# Patient Record
Sex: FEMALE | Race: OTHER | Employment: FULL TIME | URBAN - METROPOLITAN AREA
[De-identification: names, ages, dates, MRNs, and addresses within clinical notes are randomized per-mention and may not be internally consistent; named-entity substitution may affect disease eponyms.]

---

## 2017-01-27 ENCOUNTER — HOSPITAL ENCOUNTER (EMERGENCY)
Facility: HOSPITAL | Age: 42
Discharge: HOME | End: 2017-01-27
Payer: MEDICAID

## 2017-01-27 VITALS
RESPIRATION RATE: 18 BRPM | SYSTOLIC BLOOD PRESSURE: 116 MMHG | HEIGHT: 61 IN | WEIGHT: 160 LBS | BODY MASS INDEX: 30.21 KG/M2 | OXYGEN SATURATION: 99 % | DIASTOLIC BLOOD PRESSURE: 68 MMHG | TEMPERATURE: 99 F | HEART RATE: 101 BPM

## 2017-01-27 DIAGNOSIS — B34.9 VIRAL ILLNESS: Primary | ICD-10-CM

## 2017-01-27 PROCEDURE — 99283 EMERGENCY DEPT VISIT LOW MDM: CPT

## 2017-01-27 RX ORDER — IBUPROFEN 600 MG/1
600 TABLET ORAL ONCE
Status: COMPLETED | OUTPATIENT
Start: 2017-01-27 | End: 2017-01-27

## 2017-01-27 NOTE — ED PROVIDER NOTES
Patient Seen in: [facility] Emergency Department    History   Patient presents with:  Sore Throat    Stated Complaint: sore throat    HPI    Patient presents into the emergency room for evaluation of sore throat.   Patient states the onset of these Current:/68 mmHg  Pulse 101  Temp(Src) 98.6 °F (37 °C) (Oral)  Resp 18  Ht 154.9 cm (5' 1\")  Wt 72.576 kg  BMI 30.25 kg/m2  SpO2 99%  LMP 12/28/2016 (Approximate)        Physical Exam   Constitutional: She is oriented to person, place, and yno

## 2017-01-30 ENCOUNTER — OFFICE VISIT (OUTPATIENT)
Dept: FAMILY MEDICINE CLINIC | Facility: CLINIC | Age: 42
End: 2017-01-30

## 2017-01-30 VITALS
SYSTOLIC BLOOD PRESSURE: 134 MMHG | TEMPERATURE: 98 F | HEART RATE: 86 BPM | DIASTOLIC BLOOD PRESSURE: 93 MMHG | BODY MASS INDEX: 30.4 KG/M2 | RESPIRATION RATE: 20 BRPM | WEIGHT: 161 LBS | HEIGHT: 61 IN

## 2017-01-30 DIAGNOSIS — J06.9 ACUTE URI: ICD-10-CM

## 2017-01-30 DIAGNOSIS — J02.9 SORE THROAT: ICD-10-CM

## 2017-01-30 PROCEDURE — 99213 OFFICE O/P EST LOW 20 MIN: CPT | Performed by: FAMILY MEDICINE

## 2017-01-30 PROCEDURE — 99212 OFFICE O/P EST SF 10 MIN: CPT | Performed by: FAMILY MEDICINE

## 2017-01-30 RX ORDER — AMOXICILLIN 875 MG/1
875 TABLET, COATED ORAL 2 TIMES DAILY
Qty: 20 TABLET | Refills: 0 | Status: SHIPPED | OUTPATIENT
Start: 2017-01-30 | End: 2018-08-04

## 2017-01-30 NOTE — PROGRESS NOTES
HPI:    Patient ID: Daphene Frankel is a 42year old female. HPI Comments: Pt presents with cold symptoms for 7 days. Pt has had cough, sore throat with runny nose. No fevers. Pt has tried otc remedies without relief.  Pt states sick contacts with her charity Imaging & Referrals:  None       IY#[id number]

## 2017-12-20 ENCOUNTER — APPOINTMENT (OUTPATIENT)
Dept: OTHER | Facility: HOSPITAL | Age: 42
End: 2017-12-20
Attending: EMERGENCY MEDICINE

## 2018-04-18 ENCOUNTER — NURSE TRIAGE (OUTPATIENT)
Dept: OTHER | Age: 43
End: 2018-04-18

## 2018-04-18 NOTE — TELEPHONE ENCOUNTER
BRODIETCIVANA   When the patient calls back please let her know she will be self pay for her insurance termed on 3/31/18. Please try again later.

## 2018-04-18 NOTE — TELEPHONE ENCOUNTER
Action Requested: Summary for Provider     []  Critical Lab, Recommendations Needed  [] Need Additional Advice  []   FYI    []   Need Orders  [] Need Medications Sent to Pharmacy  []  Other     SUMMARY:   No appointment noted for today.    Appointment made

## 2018-04-19 ENCOUNTER — TELEPHONE (OUTPATIENT)
Dept: FAMILY MEDICINE CLINIC | Facility: CLINIC | Age: 43
End: 2018-04-19

## 2018-04-19 NOTE — TELEPHONE ENCOUNTER
Spoke with patient and states she will be self pay at [address] tomorrow. She just started a new job and is waiting on that insurance, but cannot wait any longer to get her symptoms evaluated. No further questions/concerns at this time.

## 2018-04-19 NOTE — TELEPHONE ENCOUNTER
Pt was late for her appt on 4/19 and the next available appt for her was not until 5/19 pt didn't want that date she wanted something sooner because the appt regards a lump under the breast        Please advise

## 2018-04-19 NOTE — TELEPHONE ENCOUNTER
Message noted; I believe I have sooner appointment then that. If she needs to see someone for late hours then will need to see one of the other physicians here like Dr Kaity Jaeger or Steven. . Otherwise you can add her to a SDS on a day that is convenient for her o

## 2018-04-20 NOTE — TELEPHONE ENCOUNTER
No SDS appts left at [facility] for Sat 4/21    Pt only available after 5:00p during week- SDS slots available Tues 4/24 with [facility] or [facility]    Is it ok to use one of these SDS slots?     Please reply to pool: LOLLY Mata

## 2018-08-04 ENCOUNTER — OFFICE VISIT (OUTPATIENT)
Dept: OBGYN CLINIC | Facility: CLINIC | Age: 43
End: 2018-08-04
Payer: COMMERCIAL

## 2018-08-04 VITALS
HEIGHT: 61 IN | DIASTOLIC BLOOD PRESSURE: 79 MMHG | WEIGHT: 191 LBS | SYSTOLIC BLOOD PRESSURE: 115 MMHG | BODY MASS INDEX: 36.06 KG/M2 | HEART RATE: 66 BPM

## 2018-08-04 DIAGNOSIS — N95.1 SYMPTOMATIC MENOPAUSAL OR FEMALE CLIMACTERIC STATES: ICD-10-CM

## 2018-08-04 DIAGNOSIS — N39.3 STRESS INCONTINENCE IN FEMALE: ICD-10-CM

## 2018-08-04 DIAGNOSIS — Z01.419 WOMEN'S ANNUAL ROUTINE GYNECOLOGICAL EXAMINATION: Primary | ICD-10-CM

## 2018-08-04 DIAGNOSIS — Z11.3 SCREEN FOR STD (SEXUALLY TRANSMITTED DISEASE): ICD-10-CM

## 2018-08-04 PROCEDURE — 99396 PREV VISIT EST AGE 40-64: CPT | Performed by: ADVANCED PRACTICE MIDWIFE

## 2018-08-04 NOTE — PROGRESS NOTES
HPI:    Patient ID: Mariam Moffett is a 36year old female who presents for her annual gyne exam.  Pt is in a monogamous relationship has a TL for contraception. Denies any ETOH or drug use. Denies any abuse.   Denies any hx of GDM or HTN in pregnancie foreign body in the vagina. No vaginal discharge found. Genitourinary Comments: Limited by central obesity     Lymphadenopathy:        Right: No inguinal adenopathy present. Left: No inguinal adenopathy present.    Neurological: She is alert and or

## 2018-08-05 ENCOUNTER — APPOINTMENT (OUTPATIENT)
Dept: LAB | Facility: HOSPITAL | Age: 43
End: 2018-08-05
Attending: UROLOGY
Payer: COMMERCIAL

## 2018-08-05 DIAGNOSIS — Z01.419 WOMEN'S ANNUAL ROUTINE GYNECOLOGICAL EXAMINATION: ICD-10-CM

## 2018-08-05 LAB
ALBUMIN SERPL BCP-MCNC: 3.7 G/DL (ref 3.5–4.8)
ALBUMIN/GLOB SERPL: 1.1 {RATIO} (ref 1–2)
ALP SERPL-CCNC: 80 U/L (ref 32–100)
ALT SERPL-CCNC: 16 U/L (ref 14–54)
ANION GAP SERPL CALC-SCNC: 7 MMOL/L (ref 0–18)
AST SERPL-CCNC: 18 U/L (ref 15–41)
BILIRUB SERPL-MCNC: 0.5 MG/DL (ref 0.3–1.2)
BUN SERPL-MCNC: 18 MG/DL (ref 8–20)
BUN/CREAT SERPL: 28.6 (ref 10–20)
CALCIUM SERPL-MCNC: 9 MG/DL (ref 8.5–10.5)
CHLORIDE SERPL-SCNC: 106 MMOL/L (ref 95–110)
CHOLEST SERPL-MCNC: 234 MG/DL (ref 110–200)
CO2 SERPL-SCNC: 28 MMOL/L (ref 22–32)
CREAT SERPL-MCNC: 0.63 MG/DL (ref 0.5–1.5)
ERYTHROCYTE [DISTWIDTH] IN BLOOD BY AUTOMATED COUNT: 17.4 % (ref 11–15)
GLOBULIN PLAS-MCNC: 3.5 G/DL (ref 2.5–3.7)
GLUCOSE SERPL-MCNC: 120 MG/DL (ref 70–99)
HCT VFR BLD AUTO: 34.6 % (ref 35–48)
HDLC SERPL-MCNC: 48 MG/DL
HGB BLD-MCNC: 11.2 G/DL (ref 12–16)
LDLC SERPL CALC-MCNC: 158 MG/DL (ref 0–99)
MCH RBC QN AUTO: 25.2 PG (ref 27–32)
MCHC RBC AUTO-ENTMCNC: 32.2 G/DL (ref 32–37)
MCV RBC AUTO: 78.2 FL (ref 80–100)
NONHDLC SERPL-MCNC: 186 MG/DL
OSMOLALITY UR CALC.SUM OF ELEC: 295 MOSM/KG (ref 275–295)
PATIENT FASTING: YES
PLATELET # BLD AUTO: 365 K/UL (ref 140–400)
PMV BLD AUTO: 8 FL (ref 7.4–10.3)
POTASSIUM SERPL-SCNC: 4.2 MMOL/L (ref 3.3–5.1)
PROT SERPL-MCNC: 7.2 G/DL (ref 5.9–8.4)
RBC # BLD AUTO: 4.42 M/UL (ref 3.7–5.4)
SODIUM SERPL-SCNC: 141 MMOL/L (ref 136–144)
TRIGL SERPL-MCNC: 138 MG/DL (ref 1–149)
TSH SERPL-ACNC: 2.17 UIU/ML (ref 0.45–5.33)
WBC # BLD AUTO: 6.5 K/UL (ref 4–11)

## 2018-08-05 PROCEDURE — 83036 HEMOGLOBIN GLYCOSYLATED A1C: CPT

## 2018-08-05 PROCEDURE — 85027 COMPLETE CBC AUTOMATED: CPT

## 2018-08-05 PROCEDURE — 80061 LIPID PANEL: CPT

## 2018-08-05 PROCEDURE — 36415 COLL VENOUS BLD VENIPUNCTURE: CPT

## 2018-08-05 PROCEDURE — 80053 COMPREHEN METABOLIC PANEL: CPT

## 2018-08-05 PROCEDURE — 84443 ASSAY THYROID STIM HORMONE: CPT

## 2018-08-06 LAB
C TRACH DNA SPEC QL NAA+PROBE: NEGATIVE
HBA1C MFR BLD: 6 % (ref 4–6)
HPV I/H RISK 1 DNA SPEC QL NAA+PROBE: NEGATIVE
N GONORRHOEA DNA SPEC QL NAA+PROBE: NEGATIVE

## 2018-08-07 ENCOUNTER — TELEPHONE (OUTPATIENT)
Dept: OBGYN CLINIC | Facility: CLINIC | Age: 43
End: 2018-08-07

## 2018-08-07 NOTE — TELEPHONE ENCOUNTER
----- Message from Zulma Gee CNM sent at 8/7/2018  8:52 AM CDT -----  Please call [facility] , lipids and glucose are high  Pt needs to have appt with PCP for management

## 2018-08-07 NOTE — TELEPHONE ENCOUNTER
Spoke with pt and advised per MES HA1C, lipids, and glucose are high and pt needs to make appt with PCP for management. Pt agreed and voiced understanding.

## 2018-08-08 ENCOUNTER — TELEPHONE (OUTPATIENT)
Dept: OBGYN CLINIC | Facility: CLINIC | Age: 43
End: 2018-08-08

## 2018-08-08 LAB — LAST PAP RESULT: NORMAL

## 2018-08-08 NOTE — TELEPHONE ENCOUNTER
Spoke with pt and advised of normal pap smear per MES. Pt has appt scheduled with PCP on 8/11. Pt agreed and voiced understanding.

## 2018-08-08 NOTE — TELEPHONE ENCOUNTER
----- Message from Anastacia Yan CNM sent at 8/8/2018 12:55 PM CDT -----  Please call Pap normal  See if she has scheduled her PCP appt for her abnormal labs

## 2018-08-11 ENCOUNTER — OFFICE VISIT (OUTPATIENT)
Dept: FAMILY MEDICINE CLINIC | Facility: CLINIC | Age: 43
End: 2018-08-11
Payer: COMMERCIAL

## 2018-08-11 VITALS
WEIGHT: 191 LBS | SYSTOLIC BLOOD PRESSURE: 102 MMHG | RESPIRATION RATE: 18 BRPM | TEMPERATURE: 98 F | BODY MASS INDEX: 36.06 KG/M2 | HEIGHT: 61 IN | DIASTOLIC BLOOD PRESSURE: 69 MMHG | HEART RATE: 71 BPM

## 2018-08-11 DIAGNOSIS — E78.00 ELEVATED CHOLESTEROL: ICD-10-CM

## 2018-08-11 DIAGNOSIS — R73.03 PREDIABETES: ICD-10-CM

## 2018-08-11 PROCEDURE — 99213 OFFICE O/P EST LOW 20 MIN: CPT | Performed by: FAMILY MEDICINE

## 2018-08-11 PROCEDURE — 99212 OFFICE O/P EST SF 10 MIN: CPT | Performed by: FAMILY MEDICINE

## 2018-08-11 NOTE — PROGRESS NOTES
HPI:    Patient ID: Raegan Tong is a 36year old female. Pt had his blood work prior to the appointment with Chayito Almazan. Lab work was stable and good except glucose of 120 and elevation of cholesterol.  Pt was encouraged to follow up to discuss r

## 2018-10-04 ENCOUNTER — TELEPHONE (OUTPATIENT)
Dept: OBGYN CLINIC | Facility: CLINIC | Age: 43
End: 2018-10-04

## 2018-11-12 ENCOUNTER — HOSPITAL ENCOUNTER (OUTPATIENT)
Dept: MAMMOGRAPHY | Facility: HOSPITAL | Age: 43
Discharge: HOME | End: 2018-11-12
Attending: ADVANCED PRACTICE MIDWIFE
Payer: COMMERCIAL

## 2018-11-12 DIAGNOSIS — Z01.419 WOMEN'S ANNUAL ROUTINE GYNECOLOGICAL EXAMINATION: ICD-10-CM

## 2018-11-12 PROCEDURE — 77063 BREAST TOMOSYNTHESIS BI: CPT | Performed by: ADVANCED PRACTICE MIDWIFE

## 2018-11-12 PROCEDURE — 77067 SCR MAMMO BI INCL CAD: CPT | Performed by: ADVANCED PRACTICE MIDWIFE

## 2018-11-29 ENCOUNTER — HOSPITAL ENCOUNTER (OUTPATIENT)
Dept: MAMMOGRAPHY | Facility: HOSPITAL | Age: 43
Discharge: HOME | End: 2018-11-29
Attending: ADVANCED PRACTICE MIDWIFE
Payer: COMMERCIAL

## 2018-11-29 ENCOUNTER — HOSPITAL ENCOUNTER (OUTPATIENT)
Dept: ULTRASOUND IMAGING | Facility: HOSPITAL | Age: 43
Discharge: HOME | End: 2018-11-29
Attending: ADVANCED PRACTICE MIDWIFE
Payer: COMMERCIAL

## 2018-11-29 DIAGNOSIS — R92.8 ABNORMAL MAMMOGRAM: ICD-10-CM

## 2018-11-29 PROCEDURE — 77065 DX MAMMO INCL CAD UNI: CPT | Performed by: ADVANCED PRACTICE MIDWIFE

## 2018-11-29 PROCEDURE — 76642 ULTRASOUND BREAST LIMITED: CPT | Performed by: ADVANCED PRACTICE MIDWIFE

## 2018-11-29 PROCEDURE — 77061 BREAST TOMOSYNTHESIS UNI: CPT | Performed by: ADVANCED PRACTICE MIDWIFE

## 2019-02-28 ENCOUNTER — OFFICE VISIT (OUTPATIENT)
Dept: INTERNAL MEDICINE CLINIC | Facility: CLINIC | Age: 44
End: 2019-02-28

## 2019-02-28 ENCOUNTER — NURSE TRIAGE (OUTPATIENT)
Dept: OTHER | Age: 44
End: 2019-02-28

## 2019-02-28 VITALS
DIASTOLIC BLOOD PRESSURE: 82 MMHG | HEART RATE: 75 BPM | RESPIRATION RATE: 20 BRPM | BODY MASS INDEX: 37.19 KG/M2 | TEMPERATURE: 98 F | WEIGHT: 197 LBS | SYSTOLIC BLOOD PRESSURE: 118 MMHG | HEIGHT: 61 IN

## 2019-02-28 DIAGNOSIS — M79.601 RIGHT UPPER LIMB PAIN: Primary | ICD-10-CM

## 2019-02-28 PROCEDURE — 99214 OFFICE O/P EST MOD 30 MIN: CPT | Performed by: INTERNAL MEDICINE

## 2019-02-28 PROCEDURE — 99212 OFFICE O/P EST SF 10 MIN: CPT | Performed by: INTERNAL MEDICINE

## 2019-02-28 NOTE — PROGRESS NOTES
HPI:    Patient ID: Valencia Tierney is a 39year old female. Patient presents with:  Abdominal Pain: Pt state that she has been having right  upper abdominal pain for the past 6-7 months ago but got worse on yesterday.     Patient presents today for th Psychiatric/Behavioral: Negative for confusion. The patient is not nervous/anxious. No current outpatient medications on file. Allergies:Not on File   PHYSICAL EXAM:   Physical Exam   Constitutional: She is oriented to person, place, and time. Right upper limb pain  (primary encounter diagnosis)  1. Right upper limb pain  Likely gallstones  Pt  educaiton      Recommend Low  Fat  Diet  Avoid  heavy  Meals   - COMP METABOLIC PANEL (14); Future  - LIPID PANEL; Future  - LIPASE;  Future  - CBC WITH D

## 2019-02-28 NOTE — TELEPHONE ENCOUNTER
Action Requested: Summary for Provider     []  Critical Lab, Recommendations Needed  [] Need Additional Advice  [x]   FYI    []   Need Orders  [] Need Medications Sent to Pharmacy  []  Other     SUMMARY: patient stated that has been having pain under her r

## 2019-03-02 ENCOUNTER — LAB ENCOUNTER (OUTPATIENT)
Dept: LAB | Age: 44
End: 2019-03-02
Attending: INTERNAL MEDICINE
Payer: COMMERCIAL

## 2019-03-02 DIAGNOSIS — M79.601 RIGHT UPPER LIMB PAIN: ICD-10-CM

## 2019-03-02 LAB
ALBUMIN SERPL-MCNC: 3.5 G/DL (ref 3.4–5)
ALBUMIN/GLOB SERPL: 0.9 {RATIO} (ref 1–2)
ALP LIVER SERPL-CCNC: 114 U/L (ref 37–98)
ALT SERPL-CCNC: 42 U/L (ref 13–56)
ANION GAP SERPL CALC-SCNC: 7 MMOL/L (ref 0–18)
AST SERPL-CCNC: 28 U/L (ref 15–37)
BASOPHILS # BLD AUTO: 0.05 X10(3) UL (ref 0–0.2)
BASOPHILS NFR BLD AUTO: 0.8 %
BILIRUB SERPL-MCNC: 0.3 MG/DL (ref 0.1–2)
BILIRUB UR QL: NEGATIVE
BUN BLD-MCNC: 21 MG/DL (ref 7–18)
BUN/CREAT SERPL: 31.8 (ref 10–20)
CALCIUM BLD-MCNC: 8.6 MG/DL (ref 8.5–10.1)
CHLORIDE SERPL-SCNC: 108 MMOL/L (ref 98–107)
CHOLEST SMN-MCNC: 238 MG/DL (ref ?–200)
CLARITY UR: CLEAR
CO2 SERPL-SCNC: 26 MMOL/L (ref 21–32)
COLOR UR: YELLOW
CREAT BLD-MCNC: 0.66 MG/DL (ref 0.55–1.02)
DEPRECATED RDW RBC AUTO: 47.6 FL (ref 35.1–46.3)
EOSINOPHIL # BLD AUTO: 0.19 X10(3) UL (ref 0–0.7)
EOSINOPHIL NFR BLD AUTO: 3.1 %
ERYTHROCYTE [DISTWIDTH] IN BLOOD BY AUTOMATED COUNT: 14.7 % (ref 11–15)
GLOBULIN PLAS-MCNC: 4 G/DL (ref 2.8–4.4)
GLUCOSE BLD-MCNC: 123 MG/DL (ref 70–99)
GLUCOSE UR-MCNC: NEGATIVE MG/DL
HCT VFR BLD AUTO: 38.3 % (ref 35–48)
HDLC SERPL-MCNC: 39 MG/DL (ref 40–59)
HGB BLD-MCNC: 11.8 G/DL (ref 12–16)
HGB UR QL STRIP.AUTO: NEGATIVE
IMM GRANULOCYTES # BLD AUTO: 0.01 X10(3) UL (ref 0–1)
IMM GRANULOCYTES NFR BLD: 0.2 %
KETONES UR-MCNC: NEGATIVE MG/DL
LDLC SERPL CALC-MCNC: 134 MG/DL (ref ?–100)
LEUKOCYTE ESTERASE UR QL STRIP.AUTO: NEGATIVE
LIPASE SERPL-CCNC: 203 U/L (ref 73–393)
LYMPHOCYTES # BLD AUTO: 1.64 X10(3) UL (ref 1–4)
LYMPHOCYTES NFR BLD AUTO: 26.8 %
M PROTEIN MFR SERPL ELPH: 7.5 G/DL (ref 6.4–8.2)
MCH RBC QN AUTO: 27 PG (ref 26–34)
MCHC RBC AUTO-ENTMCNC: 30.8 G/DL (ref 31–37)
MCV RBC AUTO: 87.6 FL (ref 80–100)
MONOCYTES # BLD AUTO: 0.48 X10(3) UL (ref 0.1–1)
MONOCYTES NFR BLD AUTO: 7.8 %
NEUTROPHILS # BLD AUTO: 3.76 X10 (3) UL (ref 1.5–7.7)
NEUTROPHILS # BLD AUTO: 3.76 X10(3) UL (ref 1.5–7.7)
NEUTROPHILS NFR BLD AUTO: 61.3 %
NITRITE UR QL STRIP.AUTO: NEGATIVE
NONHDLC SERPL-MCNC: 199 MG/DL (ref ?–130)
OSMOLALITY SERPL CALC.SUM OF ELEC: 296 MOSM/KG (ref 275–295)
PH UR: 5 [PH] (ref 5–8)
PLATELET # BLD AUTO: 332 10(3)UL (ref 150–450)
POTASSIUM SERPL-SCNC: 4.3 MMOL/L (ref 3.5–5.1)
PROT UR-MCNC: NEGATIVE MG/DL
RBC # BLD AUTO: 4.37 X10(6)UL (ref 3.8–5.3)
SODIUM SERPL-SCNC: 141 MMOL/L (ref 136–145)
SP GR UR STRIP: 1.02 (ref 1–1.03)
TRIGL SERPL-MCNC: 323 MG/DL (ref 30–149)
UROBILINOGEN UR STRIP-ACNC: <2
VIT C UR-MCNC: NEGATIVE MG/DL
VLDLC SERPL CALC-MCNC: 65 MG/DL (ref 0–30)
WBC # BLD AUTO: 6.1 X10(3) UL (ref 4–11)

## 2019-03-02 PROCEDURE — 85025 COMPLETE CBC W/AUTO DIFF WBC: CPT

## 2019-03-02 PROCEDURE — 80061 LIPID PANEL: CPT

## 2019-03-02 PROCEDURE — 36415 COLL VENOUS BLD VENIPUNCTURE: CPT

## 2019-03-02 PROCEDURE — 80053 COMPREHEN METABOLIC PANEL: CPT

## 2019-03-02 PROCEDURE — 83690 ASSAY OF LIPASE: CPT

## 2019-03-02 PROCEDURE — 81003 URINALYSIS AUTO W/O SCOPE: CPT

## 2019-03-06 ENCOUNTER — APPOINTMENT (OUTPATIENT)
Dept: ULTRASOUND IMAGING | Facility: HOSPITAL | Age: 44
End: 2019-03-06
Attending: EMERGENCY MEDICINE
Payer: COMMERCIAL

## 2019-03-06 ENCOUNTER — HOSPITAL ENCOUNTER (EMERGENCY)
Facility: HOSPITAL | Age: 44
Discharge: HOME | End: 2019-03-06
Attending: EMERGENCY MEDICINE
Payer: COMMERCIAL

## 2019-03-06 VITALS
RESPIRATION RATE: 18 BRPM | BODY MASS INDEX: 35.87 KG/M2 | HEIGHT: 61 IN | HEART RATE: 66 BPM | SYSTOLIC BLOOD PRESSURE: 107 MMHG | WEIGHT: 190 LBS | DIASTOLIC BLOOD PRESSURE: 61 MMHG | TEMPERATURE: 98 F | OXYGEN SATURATION: 97 %

## 2019-03-06 DIAGNOSIS — K80.20 CALCULUS OF GALLBLADDER WITHOUT CHOLECYSTITIS WITHOUT OBSTRUCTION: Primary | ICD-10-CM

## 2019-03-06 LAB
ALBUMIN SERPL-MCNC: 3.8 G/DL (ref 3.4–5)
ALP LIVER SERPL-CCNC: 119 U/L (ref 37–98)
ALT SERPL-CCNC: 41 U/L (ref 13–56)
ALT SERPL-CCNC: 44 U/L (ref 13–56)
ANION GAP SERPL CALC-SCNC: 8 MMOL/L (ref 0–18)
AST SERPL-CCNC: 19 U/L (ref 15–37)
BASOPHILS # BLD AUTO: 0.04 X10(3) UL (ref 0–0.2)
BASOPHILS NFR BLD AUTO: 0.6 %
BILIRUB DIRECT SERPL-MCNC: <0.1 MG/DL (ref 0–0.2)
BILIRUB SERPL-MCNC: 0.3 MG/DL (ref 0.1–2)
BILIRUB SERPL-MCNC: 0.5 MG/DL (ref 0.1–2)
BUN BLD-MCNC: 17 MG/DL (ref 7–18)
BUN/CREAT SERPL: 24.6 (ref 10–20)
CALCIUM BLD-MCNC: 8.7 MG/DL (ref 8.5–10.1)
CHLORIDE SERPL-SCNC: 105 MMOL/L (ref 98–107)
CO2 SERPL-SCNC: 26 MMOL/L (ref 21–32)
CREAT BLD-MCNC: 0.69 MG/DL (ref 0.55–1.02)
DEPRECATED RDW RBC AUTO: 46.3 FL (ref 35.1–46.3)
EOSINOPHIL # BLD AUTO: 0.17 X10(3) UL (ref 0–0.7)
EOSINOPHIL NFR BLD AUTO: 2.6 %
ERYTHROCYTE [DISTWIDTH] IN BLOOD BY AUTOMATED COUNT: 15 % (ref 11–15)
GLUCOSE BLD-MCNC: 100 MG/DL (ref 70–99)
HCT VFR BLD AUTO: 38.6 % (ref 35–48)
HGB BLD-MCNC: 12.3 G/DL (ref 12–16)
IMM GRANULOCYTES # BLD AUTO: 0.01 X10(3) UL (ref 0–1)
IMM GRANULOCYTES NFR BLD: 0.2 %
LIPASE SERPL-CCNC: 133 U/L (ref 73–393)
LYMPHOCYTES # BLD AUTO: 2.04 X10(3) UL (ref 1–4)
LYMPHOCYTES NFR BLD AUTO: 30.7 %
M PROTEIN MFR SERPL ELPH: 8.5 G/DL (ref 6.4–8.2)
MCH RBC QN AUTO: 27.2 PG (ref 26–34)
MCHC RBC AUTO-ENTMCNC: 31.9 G/DL (ref 31–37)
MCV RBC AUTO: 85.4 FL (ref 80–100)
MONOCYTES # BLD AUTO: 0.54 X10(3) UL (ref 0.1–1)
MONOCYTES NFR BLD AUTO: 8.1 %
NEUTROPHILS # BLD AUTO: 3.84 X10 (3) UL (ref 1.5–7.7)
NEUTROPHILS # BLD AUTO: 3.84 X10(3) UL (ref 1.5–7.7)
NEUTROPHILS NFR BLD AUTO: 57.8 %
OSMOLALITY SERPL CALC.SUM OF ELEC: 290 MOSM/KG (ref 275–295)
PLATELET # BLD AUTO: 385 10(3)UL (ref 150–450)
POTASSIUM SERPL-SCNC: 3.7 MMOL/L (ref 3.5–5.1)
RBC # BLD AUTO: 4.52 X10(6)UL (ref 3.8–5.3)
SODIUM SERPL-SCNC: 139 MMOL/L (ref 136–145)
WBC # BLD AUTO: 6.6 X10(3) UL (ref 4–11)

## 2019-03-06 PROCEDURE — 96374 THER/PROPH/DIAG INJ IV PUSH: CPT

## 2019-03-06 PROCEDURE — 85025 COMPLETE CBC W/AUTO DIFF WBC: CPT | Performed by: EMERGENCY MEDICINE

## 2019-03-06 PROCEDURE — 80048 BASIC METABOLIC PNL TOTAL CA: CPT | Performed by: EMERGENCY MEDICINE

## 2019-03-06 PROCEDURE — 76705 ECHO EXAM OF ABDOMEN: CPT | Performed by: EMERGENCY MEDICINE

## 2019-03-06 PROCEDURE — 99284 EMERGENCY DEPT VISIT MOD MDM: CPT

## 2019-03-06 PROCEDURE — 83690 ASSAY OF LIPASE: CPT | Performed by: EMERGENCY MEDICINE

## 2019-03-06 PROCEDURE — 80076 HEPATIC FUNCTION PANEL: CPT | Performed by: EMERGENCY MEDICINE

## 2019-03-06 RX ORDER — MORPHINE SULFATE 4 MG/ML
4 INJECTION, SOLUTION INTRAMUSCULAR; INTRAVENOUS ONCE
Status: COMPLETED | OUTPATIENT
Start: 2019-03-06 | End: 2019-03-06

## 2019-03-06 RX ORDER — HYDROCODONE BITARTRATE AND ACETAMINOPHEN 5; 325 MG/1; MG/1
1-2 TABLET ORAL EVERY 4 HOURS PRN
Qty: 15 TABLET | Refills: 0 | Status: SHIPPED | OUTPATIENT
Start: 2019-03-06 | End: 2019-03-13

## 2019-03-07 NOTE — ED PROVIDER NOTES
Patient Seen in: [facility] Emergency Department    History   Patient presents with:  Abdomen/Flank Pain (GI/)    Stated Complaint: R Flank Pain radiating to back    HPI    57-year-old female with no significant past medical history here with co [03/06/19 1855]   /69   Pulse 65   Resp 17   Temp 98 °F (36.7 °C)   Temp src Oral   SpO2 98 %   O2 Device None (Room air)       Current:/78   Pulse 67   Temp 98 °F (36.7 °C) (Oral)   Resp 18   Ht 154.9 cm (5' 1\")   Wt 86.2 kg   LMP 02/05/2019 8 0 - 18 mmol/L    BUN 17 7 - 18 mg/dL    Creatinine 0.69 0.55 - 1.02 mg/dL    BUN/CREA Ratio 24.6 (H) 10.0 - 20.0    Calcium, Total 8.7 8.5 - 10.1 mg/dL    Calculated Osmolality 290 275 - 295 mOsm/kg    GFR, Non- 106 >=60    GFR, -A U/L    ALT 41 13 - 56 U/L    Bilirubin, Total 0.3 0.1 - 2.0 mg/dL    Bilirubin, Direct <0.1 0.0 - 0.2 mg/dL       Imaging Results Available and Reviewed by me while in ED:  Us Gallbladder (cpt=76705)    Result Date: 3/6/2019  CONCLUSION:  1. Multiple galls the diagnostics, multiple initial diagnoses were considered based on the presenting problem including cholelithiasis, cholecystitis, UTI, nephrolithiasis.     Disposition and Plan     Clinical Impression:  Calculus of gallbladder without cholecystitis witho

## 2019-03-07 NOTE — PROGRESS NOTES
Please call patient with blood test results.     Kidney and liver function are normal,   There is mild anemia  Lipase pancreatic enzyme in his normal  Cholesterol is elevated triglycerides and also LDL-please keep with low sugar and carb diet and decrease s

## 2019-03-07 NOTE — ED INITIAL ASSESSMENT (HPI)
Pt presents to ER with c/o right upper quadrant pain that radiates to her back that started lastnight, pt denies N/V/D, denies fever.

## 2019-03-15 ENCOUNTER — TELEPHONE (OUTPATIENT)
Dept: SURGERY | Facility: CLINIC | Age: 44
End: 2019-03-15

## 2019-03-15 NOTE — TELEPHONE ENCOUNTER
Dr Dickson Maurer,     A referral was placed for patient to see general surgery but the dx on referral indicates \"right upper limb pain. \" Please advise if this is for right upper quad pain as opposed to limb pain. Eval for gallstones.      Thank you, Payam

## 2019-03-15 NOTE — TELEPHONE ENCOUNTER
Patient has consult with Dr. Aster Whyte on 03/20/2019. Referral states \"right upper limb pain\". Patient is coming for gallstones and right upper quadrant pain. Please advise, thank you.

## 2019-03-20 ENCOUNTER — OFFICE VISIT (OUTPATIENT)
Dept: SURGERY | Facility: CLINIC | Age: 44
End: 2019-03-20

## 2019-03-20 VITALS — BODY MASS INDEX: 35.87 KG/M2 | WEIGHT: 190 LBS | HEIGHT: 61 IN

## 2019-03-20 DIAGNOSIS — K81.1 CHRONIC CHOLECYSTITIS: Primary | ICD-10-CM

## 2019-03-20 PROCEDURE — 99244 OFF/OP CNSLTJ NEW/EST MOD 40: CPT | Performed by: SURGERY

## 2019-03-20 PROCEDURE — 99212 OFFICE O/P EST SF 10 MIN: CPT | Performed by: SURGERY

## 2019-03-21 ENCOUNTER — HOSPITAL ENCOUNTER (OUTPATIENT)
Facility: HOSPITAL | Age: 44
Setting detail: HOSPITAL OUTPATIENT SURGERY
End: 2019-03-21
Attending: SURGERY | Admitting: SURGERY
Payer: COMMERCIAL

## 2019-03-21 NOTE — H&P
History and Physical      Yousif Thomson is a 39year old female. HPI   Patient presents with:  Gallbladder: Pt referred by Dr. Marzena Carlson regarding gallstones x 3 wks. Pt c/o RUQ that radiates to back area. Pt  c/o nausea but no vomitting. normal respiratory effort  Cardiovascular: regular rate and rhythm no murmurs, gallups, or rubs  Abdomen: soft non-tender non-distended no organomegaly noted no masses   Infraumb scar.   Extremities: no edema, cyanosis, or clubbing  Neurological: exam appro

## 2019-03-25 VITALS — BODY MASS INDEX: 35.87 KG/M2 | HEIGHT: 61 IN | WEIGHT: 190 LBS

## 2019-03-25 RX ORDER — FAMOTIDINE 20 MG/1
20 TABLET ORAL ONCE
Status: CANCELLED | OUTPATIENT
Start: 2019-03-25 | End: 2019-03-25

## 2019-03-25 RX ORDER — ACETAMINOPHEN 500 MG
1000 TABLET ORAL ONCE
Status: CANCELLED | OUTPATIENT
Start: 2019-03-25 | End: 2019-03-25

## 2019-03-25 RX ORDER — SODIUM CHLORIDE, SODIUM LACTATE, POTASSIUM CHLORIDE, CALCIUM CHLORIDE 600; 310; 30; 20 MG/100ML; MG/100ML; MG/100ML; MG/100ML
INJECTION, SOLUTION INTRAVENOUS CONTINUOUS
Status: CANCELLED | OUTPATIENT
Start: 2019-03-25

## 2019-03-25 RX ORDER — METOCLOPRAMIDE 10 MG/1
10 TABLET ORAL ONCE
Status: CANCELLED | OUTPATIENT
Start: 2019-03-25 | End: 2019-03-25

## 2019-03-25 RX ORDER — HYDROCODONE BITARTRATE AND ACETAMINOPHEN 5; 325 MG/1; MG/1
1 TABLET ORAL EVERY 6 HOURS PRN
COMMUNITY
End: 2019-04-19

## 2019-04-03 ENCOUNTER — TELEPHONE (OUTPATIENT)
Dept: SURGERY | Facility: CLINIC | Age: 44
End: 2019-04-03

## 2019-04-03 NOTE — TELEPHONE ENCOUNTER
Out patient sx calling, states the location listed on sx referral is incorrect and needs to be changed prior to sx. Pt has sx scheduled tomorrow 4/4.  Pls advise, thank you

## 2019-04-04 ENCOUNTER — LAB REQUISITION (OUTPATIENT)
Dept: LAB | Facility: HOSPITAL | Age: 44
End: 2019-04-04
Payer: COMMERCIAL

## 2019-04-04 DIAGNOSIS — Z01.89 ENCOUNTER FOR OTHER SPECIFIED SPECIAL EXAMINATIONS: ICD-10-CM

## 2019-04-04 PROCEDURE — 88305 TISSUE EXAM BY PATHOLOGIST: CPT | Performed by: SURGERY

## 2019-04-04 PROCEDURE — 88304 TISSUE EXAM BY PATHOLOGIST: CPT | Performed by: SURGERY

## 2019-04-05 ENCOUNTER — TELEPHONE (OUTPATIENT)
Dept: SURGERY | Facility: CLINIC | Age: 44
End: 2019-04-05

## 2019-04-05 NOTE — TELEPHONE ENCOUNTER
Patient states she has more pain on her left side, was this normal?  Advised patient this was normal, to continue with her pain medications, and ice the area. Patient agreeable to plan.

## 2019-04-25 ENCOUNTER — TELEPHONE (OUTPATIENT)
Dept: SURGERY | Facility: CLINIC | Age: 44
End: 2019-04-25

## 2019-04-25 NOTE — TELEPHONE ENCOUNTER
Complaints about severe left sided pain and burning, never subsiding since 4/5/2019 when she first called. Appointment for 5/1/2019, but would like to see the Dr. cueva. Made appointment for 8 am 4/26/2019.   Advised her to alternate her [facility] with [facility]

## 2019-04-25 NOTE — TELEPHONE ENCOUNTER
Pt. States that she just had surgery about 3 weeks ago and she is concerned about having pain on L side of the abdomen, burning and pinching sensation. I transferred the call to RN.

## 2019-04-26 ENCOUNTER — OFFICE VISIT (OUTPATIENT)
Dept: SURGERY | Facility: CLINIC | Age: 44
End: 2019-04-26

## 2019-04-26 VITALS — WEIGHT: 190 LBS | BODY MASS INDEX: 36 KG/M2

## 2019-04-26 DIAGNOSIS — K81.1 CHRONIC CHOLECYSTITIS: Primary | ICD-10-CM

## 2019-04-26 PROCEDURE — 99212 OFFICE O/P EST SF 10 MIN: CPT | Performed by: SURGERY

## 2019-04-26 PROCEDURE — 99024 POSTOP FOLLOW-UP VISIT: CPT | Performed by: SURGERY

## 2019-04-26 NOTE — PROGRESS NOTES
Postoperative Patient Follow-up      4/26/2019    Layla Ford 39year old      HPI  Patient presents with:  Post-Op: S/P Laparoscopic Cholecystectomy 4/4/2019. Complains of pain on the left side, burning, pinching. Also complains of back pain.   Katt Butt

## 2019-08-24 ENCOUNTER — OFFICE VISIT (OUTPATIENT)
Dept: INTERNAL MEDICINE CLINIC | Facility: CLINIC | Age: 44
End: 2019-08-24

## 2019-08-24 VITALS
HEART RATE: 72 BPM | WEIGHT: 206 LBS | HEIGHT: 61 IN | SYSTOLIC BLOOD PRESSURE: 104 MMHG | TEMPERATURE: 98 F | DIASTOLIC BLOOD PRESSURE: 69 MMHG | BODY MASS INDEX: 38.89 KG/M2

## 2019-08-24 DIAGNOSIS — G62.9 NEUROPATHY: Primary | ICD-10-CM

## 2019-08-24 DIAGNOSIS — R60.0 BILATERAL LEG EDEMA: ICD-10-CM

## 2019-08-24 PROCEDURE — 99203 OFFICE O/P NEW LOW 30 MIN: CPT | Performed by: PHYSICIAN ASSISTANT

## 2019-08-24 RX ORDER — GABAPENTIN 100 MG/1
100 CAPSULE ORAL NIGHTLY
Qty: 30 CAPSULE | Refills: 0 | Status: SHIPPED | OUTPATIENT
Start: 2019-08-24

## 2019-08-24 NOTE — PROGRESS NOTES
HPI:    Patient ID: Merlyn Sol is a 39year old female. HPI   Patient presents complaining of numbness and pain on the left side of her flank into her back since her gallbladder surgery a few months ago.   States that is intermittent but happening 08/15/2019 (Approximate)   BMI 38.92 kg/m²   Body mass index is 38.92 kg/m². Physical Exam    Constitutional: She is oriented to person, place, and time and thin. She appears well-developed and well-nourished. HENT:   Head: Normocephalic and atraumatic. Referrals:  None         ID#[id number]

## 2020-05-18 ENCOUNTER — TELEPHONE (OUTPATIENT)
Dept: FAMILY MEDICINE CLINIC | Facility: CLINIC | Age: 45
End: 2020-05-18

## 2020-05-18 NOTE — TELEPHONE ENCOUNTER
Patient scheduled for video visit to discuss symptom management with Dr. Amarilis Montez. Future Appointments   Date Time Provider Josesito Rivera   5/19/2020 11:20 AM Kate Castañeda MD [facility] Tonie Olsen     She was tested for COVID on 5/18 and awaits results.

## 2020-05-18 NOTE — TELEPHONE ENCOUNTER
----- Message from Peri Lew sent at 5/18/2020  6:58 AM CDT -----  Regarding: Other  Contact: [phone]  Alfonso Zaldivar   I am going to get tested for the COVID-19 @ the [facility] on [address] [facility] can you provide them with a referral

## 2020-05-19 ENCOUNTER — TELEMEDICINE (OUTPATIENT)
Dept: FAMILY MEDICINE CLINIC | Facility: CLINIC | Age: 45
End: 2020-05-19

## 2020-05-19 DIAGNOSIS — U07.1 COVID-19 VIRUS INFECTION: ICD-10-CM

## 2020-05-19 PROCEDURE — 99213 OFFICE O/P EST LOW 20 MIN: CPT | Performed by: FAMILY MEDICINE

## 2020-05-19 NOTE — PROGRESS NOTES
HPI:    Patient ID: Patti Shepard is a 38year old female. Virtual Telephone Check-In    Patti Shepard verbally consents to a Virtual/Telephone Check-In visit on 05/19/20.     Patient understands and accepts financial responsibility for any deduc INFECTION      Meds This Visit:  Requested Prescriptions      No prescriptions requested or ordered in this encounter       Imaging & Referrals:  None       QY#[id number]

## 2020-05-20 ENCOUNTER — PATIENT OUTREACH (OUTPATIENT)
Dept: CASE MANAGEMENT | Age: 45
End: 2020-05-20

## 2020-05-20 NOTE — PROGRESS NOTES
Home Monitoring Condition Update    Covid19+ test date: 5/19/20      Consent Verification:  Assessment Completed With: Patient  HIPAA Verified?   Yes    COVID-19 HOME MONITORING 5/20/2020   Temperature 98   Reading From Mouth   Pulse 60   Pulse taken from of call. Patient advised to inform their Employee Health department or Manager when they have tested positive for COVID-19.       The patient was also directed to continue to isolate away from other household members when possible and stay completely i

## 2020-05-21 ENCOUNTER — PATIENT OUTREACH (OUTPATIENT)
Dept: CASE MANAGEMENT | Age: 45
End: 2020-05-21

## 2020-05-22 ENCOUNTER — PATIENT OUTREACH (OUTPATIENT)
Dept: CASE MANAGEMENT | Age: 45
End: 2020-05-22

## 2020-05-22 NOTE — PROGRESS NOTES
Home Monitoring Condition Update    Covid19+ test date:   5/18/2020 outside faciltiy      Consent Verification:  Assessment Completed With: Patient  HIPAA Verified?   Yes    COVID-19 HOME MONITORING 5/22/2020   Temperature 98.7   Reading From Mouth   Puls clots--patient verbalizes understanding and agreement. Instructed to contact his PCP with any questions or concerns;  instructed if condition worsen s to call PCP/go to the ED/ Call 911.     Patient advised to inform their Employee Health department o

## 2020-05-26 ENCOUNTER — VIRTUAL PHONE E/M (OUTPATIENT)
Dept: FAMILY MEDICINE CLINIC | Facility: CLINIC | Age: 45
End: 2020-05-26

## 2020-05-26 DIAGNOSIS — U07.1 COVID-19: ICD-10-CM

## 2020-05-26 PROCEDURE — 99213 OFFICE O/P EST LOW 20 MIN: CPT | Performed by: FAMILY MEDICINE

## 2020-05-26 NOTE — PROGRESS NOTES
HPI:    Patient ID: Valencia Tierney is a 38year old female. Virtual Telephone Check-In    Valencia Tierney verbally consents to a Virtual/Telephone Check-In visit on 05/26/20.   Patient has been referred to the [facility] website at [email or web address] defined types were placed in this encounter.       Meds This Visit:  Requested Prescriptions      No prescriptions requested or ordered in this encounter       Imaging & Referrals:  None       #[id number]

## 2020-05-27 ENCOUNTER — PATIENT MESSAGE (OUTPATIENT)
Dept: FAMILY MEDICINE CLINIC | Facility: CLINIC | Age: 45
End: 2020-05-27

## 2020-05-27 ENCOUNTER — TELEPHONE (OUTPATIENT)
Dept: FAMILY MEDICINE CLINIC | Facility: CLINIC | Age: 45
End: 2020-05-27

## 2020-05-27 NOTE — TELEPHONE ENCOUNTER
Please advise    Can the patient be discharged from Home Monitoring?       Please respond to the [facility] pool

## 2020-05-28 NOTE — TELEPHONE ENCOUNTER
From: Eleni Brown  To: Vinnie Naidu MD  Sent: 5/27/2020 9:59 PM CDT  Subject: Other    Hello doctor I have a question when will my  be able to be in the same room with me.  How long do we have to wait after the 14 days so we can sleep monse

## 2020-06-26 ENCOUNTER — PATIENT MESSAGE (OUTPATIENT)
Dept: FAMILY MEDICINE CLINIC | Facility: CLINIC | Age: 45
End: 2020-06-26

## 2020-06-26 NOTE — TELEPHONE ENCOUNTER
.Letter sent to pt via [facility].  Pt notified through [facility]

## 2020-06-26 NOTE — TELEPHONE ENCOUNTER
From: Cande Givens  To: Marly Ayala MD  Sent: 6/26/2020 9:47 AM CDT  Subject: Non-Urgent Erleen Labor Dr Kourtney Davis can you send me a letter of when I was discharge from Covid-19 so I can return to work on Monday please

## 2021-04-06 ENCOUNTER — IMMUNIZATION (OUTPATIENT)
Dept: LAB | Age: 46
End: 2021-04-06

## 2021-04-06 DIAGNOSIS — Z23 NEED FOR VACCINATION: Primary | ICD-10-CM

## 2021-04-06 PROCEDURE — 91300 COVID 19 PFIZER-BIONTECH: CPT

## 2021-04-06 PROCEDURE — 0001A COVID 19 PFIZER-BIONTECH: CPT

## 2021-04-28 ENCOUNTER — IMMUNIZATION (OUTPATIENT)
Dept: LAB | Age: 46
End: 2021-04-28
Attending: HOSPITALIST

## 2021-04-28 DIAGNOSIS — Z23 NEED FOR VACCINATION: Primary | ICD-10-CM

## 2021-04-28 PROCEDURE — 0002A COVID 19 PFIZER-BIONTECH: CPT

## 2021-04-28 PROCEDURE — 91300 COVID 19 PFIZER-BIONTECH: CPT

## 2021-07-08 ENCOUNTER — PATIENT MESSAGE (OUTPATIENT)
Dept: FAMILY MEDICINE CLINIC | Facility: CLINIC | Age: 46
End: 2021-07-08

## 2021-07-08 DIAGNOSIS — Z00.00 ROUTINE PHYSICAL EXAMINATION: Primary | ICD-10-CM

## 2021-07-09 NOTE — TELEPHONE ENCOUNTER
From: Truong Dial  To: Km Salas MD  Sent: 7/8/2021 6:48 PM CDT  Subject: Other    Hi Dr. Mehnaz Luu   I would like to get blood work and then depend on my results if needed to get an annual physical. Please let me know if you can send it so I can go a

## 2021-07-11 ENCOUNTER — LAB ENCOUNTER (OUTPATIENT)
Dept: LAB | Facility: HOSPITAL | Age: 46
End: 2021-07-11
Attending: FAMILY MEDICINE
Payer: COMMERCIAL

## 2021-07-11 DIAGNOSIS — Z00.00 ROUTINE PHYSICAL EXAMINATION: ICD-10-CM

## 2021-07-11 LAB
ALBUMIN SERPL-MCNC: 3.3 G/DL (ref 3.4–5)
ALBUMIN/GLOB SERPL: 0.8 {RATIO} (ref 1–2)
ALP LIVER SERPL-CCNC: 99 U/L
ALT SERPL-CCNC: 64 U/L
ANION GAP SERPL CALC-SCNC: 6 MMOL/L (ref 0–18)
AST SERPL-CCNC: 34 U/L (ref 15–37)
BILIRUB SERPL-MCNC: 0.3 MG/DL (ref 0.1–2)
BUN BLD-MCNC: 13 MG/DL (ref 7–18)
BUN/CREAT SERPL: 22.8 (ref 10–20)
CALCIUM BLD-MCNC: 8.8 MG/DL (ref 8.5–10.1)
CHLORIDE SERPL-SCNC: 106 MMOL/L (ref 98–112)
CHOLEST SMN-MCNC: 244 MG/DL (ref ?–200)
CO2 SERPL-SCNC: 26 MMOL/L (ref 21–32)
CREAT BLD-MCNC: 0.57 MG/DL
DEPRECATED RDW RBC AUTO: 45.1 FL (ref 35.1–46.3)
ERYTHROCYTE [DISTWIDTH] IN BLOOD BY AUTOMATED COUNT: 13.2 % (ref 11–15)
GLOBULIN PLAS-MCNC: 4.4 G/DL (ref 2.8–4.4)
GLUCOSE BLD-MCNC: 123 MG/DL (ref 70–99)
HCT VFR BLD AUTO: 39.9 %
HDLC SERPL-MCNC: 54 MG/DL (ref 40–59)
HGB BLD-MCNC: 13.1 G/DL
LDLC SERPL CALC-MCNC: 146 MG/DL (ref ?–100)
M PROTEIN MFR SERPL ELPH: 7.7 G/DL (ref 6.4–8.2)
MCH RBC QN AUTO: 30.7 PG (ref 26–34)
MCHC RBC AUTO-ENTMCNC: 32.8 G/DL (ref 31–37)
MCV RBC AUTO: 93.4 FL
NONHDLC SERPL-MCNC: 190 MG/DL (ref ?–130)
OSMOLALITY SERPL CALC.SUM OF ELEC: 287 MOSM/KG (ref 275–295)
PATIENT FASTING Y/N/NP: YES
PATIENT FASTING Y/N/NP: YES
PLATELET # BLD AUTO: 298 10(3)UL (ref 150–450)
POTASSIUM SERPL-SCNC: 4.3 MMOL/L (ref 3.5–5.1)
RBC # BLD AUTO: 4.27 X10(6)UL
SODIUM SERPL-SCNC: 138 MMOL/L (ref 136–145)
TRIGL SERPL-MCNC: 246 MG/DL (ref 30–149)
TSI SER-ACNC: 1.95 MIU/ML (ref 0.36–3.74)
VLDLC SERPL CALC-MCNC: 47 MG/DL (ref 0–30)
WBC # BLD AUTO: 7.7 X10(3) UL (ref 4–11)

## 2021-07-11 PROCEDURE — 80053 COMPREHEN METABOLIC PANEL: CPT

## 2021-07-11 PROCEDURE — 85027 COMPLETE CBC AUTOMATED: CPT

## 2021-07-11 PROCEDURE — 36415 COLL VENOUS BLD VENIPUNCTURE: CPT

## 2021-07-11 PROCEDURE — 84443 ASSAY THYROID STIM HORMONE: CPT

## 2021-07-11 PROCEDURE — 80061 LIPID PANEL: CPT

## 2021-08-20 NOTE — TELEPHONE ENCOUNTER
Called pt.  Pt reports she has been waking up with neck stiffness in the mornings x 1 week.  Taking OTC tylenol for arthritis with neck stiffness relief.  last dose of prednisone 8/19/2021.  Pt has celebrex on hand.    Advised pt to continue monitoring s/s at home.  Continue tylenol, celebrex, ice, heat PRN for stiff neck.  Call MD should s/s worsen or fail to improve.  Pt verbalized understanding with no questions.    Routing to MD as FYI.     Doctor, please see pt message below and advise. Message sent to pt advising she may need to schedule office visit prior to having lab orders placed.

## 2021-10-12 ENCOUNTER — OFFICE VISIT (OUTPATIENT)
Dept: FAMILY MEDICINE CLINIC | Facility: CLINIC | Age: 46
End: 2021-10-12

## 2021-10-12 VITALS
SYSTOLIC BLOOD PRESSURE: 133 MMHG | RESPIRATION RATE: 18 BRPM | TEMPERATURE: 97 F | DIASTOLIC BLOOD PRESSURE: 84 MMHG | HEART RATE: 82 BPM | HEIGHT: 61 IN | BODY MASS INDEX: 40.78 KG/M2 | WEIGHT: 216 LBS

## 2021-10-12 DIAGNOSIS — R05.9 COUGH: Primary | ICD-10-CM

## 2021-10-12 PROCEDURE — 3075F SYST BP GE 130 - 139MM HG: CPT | Performed by: FAMILY MEDICINE

## 2021-10-12 PROCEDURE — 3008F BODY MASS INDEX DOCD: CPT | Performed by: FAMILY MEDICINE

## 2021-10-12 PROCEDURE — 3079F DIAST BP 80-89 MM HG: CPT | Performed by: FAMILY MEDICINE

## 2021-10-12 PROCEDURE — 99213 OFFICE O/P EST LOW 20 MIN: CPT | Performed by: FAMILY MEDICINE

## 2021-10-12 RX ORDER — AZITHROMYCIN 250 MG/1
TABLET, FILM COATED ORAL
Qty: 6 TABLET | Refills: 0 | Status: SHIPPED | OUTPATIENT
Start: 2021-10-12 | End: 2021-10-17

## 2021-10-12 NOTE — PROGRESS NOTES
Subjective:   Patient ID: Jhonathan Thompson is a 54year old female. Pt has had cough for the last 3 weeks. No hx of asthma or allergies. No SOB or chest pains. Pt has had cough- tickle of throat, had sore throat. No fevers.  Pt has tried otc remedies w Status: She is alert. Assessment & Plan:   Cough for 3 weeks: has had COVID vaccine. - After discussion with patient, zithromax as directed; Over the counter remedies discussed; To call if worse or not better;  Follow up in one week if not resolved

## 2022-05-16 ENCOUNTER — OFFICE VISIT (OUTPATIENT)
Dept: OBGYN CLINIC | Facility: CLINIC | Age: 47
End: 2022-05-16
Payer: COMMERCIAL

## 2022-05-16 ENCOUNTER — TELEPHONE (OUTPATIENT)
Dept: FAMILY MEDICINE CLINIC | Facility: CLINIC | Age: 47
End: 2022-05-16

## 2022-05-16 VITALS
HEART RATE: 87 BPM | DIASTOLIC BLOOD PRESSURE: 85 MMHG | SYSTOLIC BLOOD PRESSURE: 125 MMHG | BODY MASS INDEX: 33 KG/M2 | WEIGHT: 175 LBS

## 2022-05-16 DIAGNOSIS — N76.0 VAGINITIS AND VULVOVAGINITIS: Primary | ICD-10-CM

## 2022-05-16 DIAGNOSIS — N94.9 VAGINAL BURNING: ICD-10-CM

## 2022-05-16 PROCEDURE — 87205 SMEAR GRAM STAIN: CPT | Performed by: ADVANCED PRACTICE MIDWIFE

## 2022-05-16 PROCEDURE — 87106 FUNGI IDENTIFICATION YEAST: CPT | Performed by: ADVANCED PRACTICE MIDWIFE

## 2022-05-16 PROCEDURE — 87808 TRICHOMONAS ASSAY W/OPTIC: CPT | Performed by: ADVANCED PRACTICE MIDWIFE

## 2022-05-16 RX ORDER — METRONIDAZOLE 7.5 MG/G
1 GEL VAGINAL NIGHTLY
Qty: 70 G | Refills: 0 | Status: SHIPPED | OUTPATIENT
Start: 2022-05-16 | End: 2022-05-21

## 2022-05-16 RX ORDER — FLUCONAZOLE 150 MG/1
150 TABLET ORAL
Qty: 2 TABLET | Refills: 0 | Status: SHIPPED | OUTPATIENT
Start: 2022-05-16 | End: 2022-05-20

## 2022-05-18 LAB
GENITAL VAGINOSIS SCREEN: NEGATIVE
TRICHOMONAS SCREEN: NEGATIVE

## 2022-07-24 ENCOUNTER — APPOINTMENT (OUTPATIENT)
Dept: CT IMAGING | Facility: HOSPITAL | Age: 47
End: 2022-07-24
Attending: NURSE PRACTITIONER
Payer: COMMERCIAL

## 2022-07-24 ENCOUNTER — HOSPITAL ENCOUNTER (EMERGENCY)
Facility: HOSPITAL | Age: 47
Discharge: HOME | End: 2022-07-24
Payer: COMMERCIAL

## 2022-07-24 VITALS
BODY MASS INDEX: 37.11 KG/M2 | RESPIRATION RATE: 18 BRPM | WEIGHT: 172 LBS | TEMPERATURE: 100 F | HEART RATE: 69 BPM | SYSTOLIC BLOOD PRESSURE: 122 MMHG | HEIGHT: 57 IN | OXYGEN SATURATION: 99 % | DIASTOLIC BLOOD PRESSURE: 71 MMHG

## 2022-07-24 DIAGNOSIS — R73.9 HYPERGLYCEMIA: ICD-10-CM

## 2022-07-24 DIAGNOSIS — R10.9 FLANK PAIN: Primary | ICD-10-CM

## 2022-07-24 LAB
ALBUMIN SERPL-MCNC: 3.5 G/DL (ref 3.4–5)
ALBUMIN/GLOB SERPL: 0.9 {RATIO} (ref 1–2)
ALP LIVER SERPL-CCNC: 151 U/L
ALT SERPL-CCNC: 78 U/L
ANION GAP SERPL CALC-SCNC: 9 MMOL/L (ref 0–18)
AST SERPL-CCNC: 43 U/L (ref 15–37)
B-HCG UR QL: NEGATIVE
BASOPHILS # BLD AUTO: 0.06 X10(3) UL (ref 0–0.2)
BASOPHILS NFR BLD AUTO: 0.8 %
BILIRUB SERPL-MCNC: 0.4 MG/DL (ref 0.1–2)
BILIRUB UR QL: NEGATIVE
BUN BLD-MCNC: 16 MG/DL (ref 7–18)
BUN/CREAT SERPL: 17 (ref 10–20)
CALCIUM BLD-MCNC: 9.1 MG/DL (ref 8.5–10.1)
CHLORIDE SERPL-SCNC: 97 MMOL/L (ref 98–112)
CLARITY UR: CLEAR
CO2 SERPL-SCNC: 31 MMOL/L (ref 21–32)
COLOR UR: YELLOW
CREAT BLD-MCNC: 0.94 MG/DL
DEPRECATED RDW RBC AUTO: 40.6 FL (ref 35.1–46.3)
EOSINOPHIL # BLD AUTO: 0.16 X10(3) UL (ref 0–0.7)
EOSINOPHIL NFR BLD AUTO: 2.2 %
ERYTHROCYTE [DISTWIDTH] IN BLOOD BY AUTOMATED COUNT: 12.3 % (ref 11–15)
GLOBULIN PLAS-MCNC: 4 G/DL (ref 2.8–4.4)
GLUCOSE BLD-MCNC: 419 MG/DL (ref 70–99)
GLUCOSE BLDC GLUCOMTR-MCNC: 330 MG/DL (ref 70–99)
GLUCOSE BLDC GLUCOMTR-MCNC: 331 MG/DL (ref 70–99)
GLUCOSE UR-MCNC: 500 MG/DL
HCT VFR BLD AUTO: 42.7 %
HGB BLD-MCNC: 14.4 G/DL
HGB UR QL STRIP.AUTO: NEGATIVE
IMM GRANULOCYTES # BLD AUTO: 0.02 X10(3) UL (ref 0–1)
IMM GRANULOCYTES NFR BLD: 0.3 %
KETONES UR-MCNC: NEGATIVE MG/DL
LEUKOCYTE ESTERASE UR QL STRIP.AUTO: NEGATIVE
LIPASE SERPL-CCNC: 268 U/L (ref 73–393)
LYMPHOCYTES # BLD AUTO: 2.2 X10(3) UL (ref 1–4)
LYMPHOCYTES NFR BLD AUTO: 30.3 %
MCH RBC QN AUTO: 30.4 PG (ref 26–34)
MCHC RBC AUTO-ENTMCNC: 33.7 G/DL (ref 31–37)
MCV RBC AUTO: 90.3 FL
MONOCYTES # BLD AUTO: 0.56 X10(3) UL (ref 0.1–1)
MONOCYTES NFR BLD AUTO: 7.7 %
NEUTROPHILS # BLD AUTO: 4.26 X10 (3) UL (ref 1.5–7.7)
NEUTROPHILS # BLD AUTO: 4.26 X10(3) UL (ref 1.5–7.7)
NEUTROPHILS NFR BLD AUTO: 58.7 %
NITRITE UR QL STRIP.AUTO: NEGATIVE
OSMOLALITY SERPL CALC.SUM OF ELEC: 303 MOSM/KG (ref 275–295)
PH UR: 6.5 [PH] (ref 5–8)
PLATELET # BLD AUTO: 257 10(3)UL (ref 150–450)
POTASSIUM SERPL-SCNC: 4 MMOL/L (ref 3.5–5.1)
PROT SERPL-MCNC: 7.5 G/DL (ref 6.4–8.2)
PROT UR-MCNC: NEGATIVE MG/DL
RBC # BLD AUTO: 4.73 X10(6)UL
SODIUM SERPL-SCNC: 137 MMOL/L (ref 136–145)
SP GR UR STRIP: 1.01 (ref 1–1.03)
UROBILINOGEN UR STRIP-ACNC: 0.2
WBC # BLD AUTO: 7.3 X10(3) UL (ref 4–11)

## 2022-07-24 PROCEDURE — 81003 URINALYSIS AUTO W/O SCOPE: CPT | Performed by: NURSE PRACTITIONER

## 2022-07-24 PROCEDURE — 74176 CT ABD & PELVIS W/O CONTRAST: CPT | Performed by: NURSE PRACTITIONER

## 2022-07-24 PROCEDURE — 82962 GLUCOSE BLOOD TEST: CPT

## 2022-07-24 PROCEDURE — 81025 URINE PREGNANCY TEST: CPT

## 2022-07-24 PROCEDURE — 83690 ASSAY OF LIPASE: CPT | Performed by: NURSE PRACTITIONER

## 2022-07-24 PROCEDURE — 99284 EMERGENCY DEPT VISIT MOD MDM: CPT

## 2022-07-24 PROCEDURE — 96360 HYDRATION IV INFUSION INIT: CPT

## 2022-07-24 PROCEDURE — 80053 COMPREHEN METABOLIC PANEL: CPT | Performed by: NURSE PRACTITIONER

## 2022-07-24 PROCEDURE — 85025 COMPLETE CBC W/AUTO DIFF WBC: CPT | Performed by: NURSE PRACTITIONER

## 2022-07-24 RX ORDER — BLOOD-GLUCOSE METER
1 KIT MISCELLANEOUS AS NEEDED
Qty: 1 EACH | Refills: 0 | Status: SHIPPED | OUTPATIENT
Start: 2022-07-24

## 2022-07-24 RX ORDER — INSULIN ASPART 100 [IU]/ML
0.15 INJECTION, SOLUTION INTRAVENOUS; SUBCUTANEOUS ONCE
Status: COMPLETED | OUTPATIENT
Start: 2022-07-24 | End: 2022-07-24

## 2022-07-24 NOTE — ED INITIAL ASSESSMENT (HPI)
States that she started to have Lt flank area pain X 2 weeks increased today. No C/O fever. No hematuria

## 2022-08-15 ENCOUNTER — PATIENT MESSAGE (OUTPATIENT)
Dept: FAMILY MEDICINE CLINIC | Facility: CLINIC | Age: 47
End: 2022-08-15

## 2022-08-15 ENCOUNTER — OFFICE VISIT (OUTPATIENT)
Dept: FAMILY MEDICINE CLINIC | Facility: CLINIC | Age: 47
End: 2022-08-15
Payer: COMMERCIAL

## 2022-08-15 VITALS
HEART RATE: 61 BPM | SYSTOLIC BLOOD PRESSURE: 104 MMHG | WEIGHT: 166 LBS | BODY MASS INDEX: 35.81 KG/M2 | HEIGHT: 57 IN | DIASTOLIC BLOOD PRESSURE: 66 MMHG

## 2022-08-15 DIAGNOSIS — E11.9 NEW ONSET TYPE 2 DIABETES MELLITUS (HCC): Primary | ICD-10-CM

## 2022-08-15 DIAGNOSIS — Z00.00 ROUTINE PHYSICAL EXAMINATION: ICD-10-CM

## 2022-08-16 NOTE — TELEPHONE ENCOUNTER
Test strips were last ordered by APRN in ER 7/24/22    "[employer] message sent to patient to confirm type of test strip, frequency of testing, and preferred pharmacy.  Will route to provider after patient response.    ----- Message -----  From: Alis Parks  Sent: 8/15/2022   3:02 PM CDT  To: Em Rn Triage  Subject: prescription refill                              Hello Doctor Did you also order refill for the testing strips i do not have anymore

## 2022-08-17 RX ORDER — BLOOD SUGAR DIAGNOSTIC
STRIP MISCELLANEOUS
Qty: 300 STRIP | Refills: 3 | Status: SHIPPED | OUTPATIENT
Start: 2022-08-17

## 2022-08-18 ENCOUNTER — TELEPHONE (OUTPATIENT)
Dept: FAMILY MEDICINE CLINIC | Facility: CLINIC | Age: 47
End: 2022-08-18

## 2022-08-18 NOTE — TELEPHONE ENCOUNTER
Called into pharmacy to change supplies and meter to [employer]. Pharmacy states that this will be at a lower cost to patient. Pharmacy will notify patient when all is ready for .

## 2022-08-31 ENCOUNTER — TELEPHONE (OUTPATIENT)
Dept: FAMILY MEDICINE CLINIC | Facility: CLINIC | Age: 47
End: 2022-08-31

## 2022-09-01 NOTE — TELEPHONE ENCOUNTER
Patient called and complains of left flank pain since July 24. Patient states that she went to ER at that time, CT of abdomen was normal. Patient states that ER physician and Dr Carisa Chung did not give her any pain relief. Patient has been taking Tylenol and Ibuprofen without relief. The pain is 8/10. Patient does not want to go to ER. Patient requests pain relief. Tramadol 50 mg PO Q6 prn # 5 tablets sent to pharmacy. Advise patient to follow up with Dr Carisa Chung tomorrow for re-evaluation and proceed to ER if symptom worsen. Patient verbalized understanding.

## 2022-09-27 ENCOUNTER — TELEPHONE (OUTPATIENT)
Dept: GASTROENTEROLOGY | Facility: CLINIC | Age: 47
End: 2022-09-27

## 2022-09-27 ENCOUNTER — OFFICE VISIT (OUTPATIENT)
Dept: GASTROENTEROLOGY | Facility: CLINIC | Age: 47
End: 2022-09-27

## 2022-09-27 ENCOUNTER — LAB ENCOUNTER (OUTPATIENT)
Dept: LAB | Age: 47
End: 2022-09-27
Attending: INTERNAL MEDICINE
Payer: COMMERCIAL

## 2022-09-27 VITALS
DIASTOLIC BLOOD PRESSURE: 72 MMHG | BODY MASS INDEX: 35.17 KG/M2 | SYSTOLIC BLOOD PRESSURE: 113 MMHG | HEART RATE: 73 BPM | HEIGHT: 57 IN | WEIGHT: 163 LBS

## 2022-09-27 DIAGNOSIS — Z12.11 COLON CANCER SCREENING: ICD-10-CM

## 2022-09-27 DIAGNOSIS — R10.9 ABDOMINAL PAIN, UNSPECIFIED ABDOMINAL LOCATION: Primary | ICD-10-CM

## 2022-09-27 DIAGNOSIS — R10.9 LEFT SIDED ABDOMINAL PAIN: ICD-10-CM

## 2022-09-27 DIAGNOSIS — R79.89 ELEVATED LFTS: ICD-10-CM

## 2022-09-27 DIAGNOSIS — R10.10 UPPER ABDOMINAL PAIN: ICD-10-CM

## 2022-09-27 DIAGNOSIS — R10.10 UPPER ABDOMINAL PAIN: Primary | ICD-10-CM

## 2022-09-27 LAB
ALBUMIN SERPL-MCNC: 4.1 G/DL (ref 3.4–5)
ALP LIVER SERPL-CCNC: 248 U/L
ALT SERPL-CCNC: 81 U/L
AST SERPL-CCNC: 34 U/L (ref 15–37)
BILIRUB DIRECT SERPL-MCNC: 0.1 MG/DL (ref 0–0.2)
BILIRUB SERPL-MCNC: 0.3 MG/DL (ref 0.1–2)
DEPRECATED HBV CORE AB SER IA-ACNC: 126.5 NG/ML
GGT SERPL-CCNC: 389 U/L
HAV AB SER QL IA: REACTIVE
HAV IGM SER QL: NONREACTIVE
HBV CORE AB SERPL QL IA: NONREACTIVE
HBV SURFACE AB SER QL: NONREACTIVE
HBV SURFACE AB SERPL IA-ACNC: <3.1 MIU/ML
HBV SURFACE AG SER-ACNC: <0.1 [IU]/L
HBV SURFACE AG SERPL QL IA: NONREACTIVE
HCV AB SERPL QL IA: NONREACTIVE
IGA SERPL-MCNC: 312 MG/DL (ref 70–312)
IGM SERPL-MCNC: 182 MG/DL (ref 43–279)
IMMUNOGLOBULIN PNL SER-MCNC: 1500 MG/DL (ref 791–1643)
INR BLD: 0.95 (ref 0.85–1.16)
IRON SATN MFR SERPL: 17 %
IRON SERPL-MCNC: 77 UG/DL
PROT SERPL-MCNC: 8.7 G/DL (ref 6.4–8.2)
PROTHROMBIN TIME: 12.6 SECONDS (ref 11.6–14.8)
TIBC SERPL-MCNC: 447 UG/DL (ref 240–450)
TRANSFERRIN SERPL-MCNC: 300 MG/DL (ref 200–360)

## 2022-09-27 PROCEDURE — 80076 HEPATIC FUNCTION PANEL: CPT

## 2022-09-27 PROCEDURE — 83540 ASSAY OF IRON: CPT

## 2022-09-27 PROCEDURE — 86803 HEPATITIS C AB TEST: CPT

## 2022-09-27 PROCEDURE — 82103 ALPHA-1-ANTITRYPSIN TOTAL: CPT

## 2022-09-27 PROCEDURE — 86704 HEP B CORE ANTIBODY TOTAL: CPT

## 2022-09-27 PROCEDURE — 87340 HEPATITIS B SURFACE AG IA: CPT

## 2022-09-27 PROCEDURE — 82977 ASSAY OF GGT: CPT

## 2022-09-27 PROCEDURE — 86706 HEP B SURFACE ANTIBODY: CPT

## 2022-09-27 PROCEDURE — 86381 MITOCHONDRIAL ANTIBODY EACH: CPT

## 2022-09-27 PROCEDURE — 85610 PROTHROMBIN TIME: CPT

## 2022-09-27 PROCEDURE — 82390 ASSAY OF CERULOPLASMIN: CPT

## 2022-09-27 PROCEDURE — 86039 ANTINUCLEAR ANTIBODIES (ANA): CPT

## 2022-09-27 PROCEDURE — 3074F SYST BP LT 130 MM HG: CPT | Performed by: INTERNAL MEDICINE

## 2022-09-27 PROCEDURE — 83013 H PYLORI (C-13) BREATH: CPT

## 2022-09-27 PROCEDURE — 82728 ASSAY OF FERRITIN: CPT

## 2022-09-27 PROCEDURE — 86038 ANTINUCLEAR ANTIBODIES: CPT

## 2022-09-27 PROCEDURE — 86709 HEPATITIS A IGM ANTIBODY: CPT

## 2022-09-27 PROCEDURE — 3078F DIAST BP <80 MM HG: CPT | Performed by: INTERNAL MEDICINE

## 2022-09-27 PROCEDURE — 86708 HEPATITIS A ANTIBODY: CPT

## 2022-09-27 PROCEDURE — 3008F BODY MASS INDEX DOCD: CPT | Performed by: INTERNAL MEDICINE

## 2022-09-27 PROCEDURE — 99244 OFF/OP CNSLTJ NEW/EST MOD 40: CPT | Performed by: INTERNAL MEDICINE

## 2022-09-27 PROCEDURE — 84466 ASSAY OF TRANSFERRIN: CPT

## 2022-09-27 PROCEDURE — 82784 ASSAY IGA/IGD/IGG/IGM EACH: CPT

## 2022-09-27 PROCEDURE — 86364 TISS TRNSGLTMNASE EA IG CLAS: CPT

## 2022-09-27 PROCEDURE — 86256 FLUORESCENT ANTIBODY TITER: CPT

## 2022-09-27 PROCEDURE — 36415 COLL VENOUS BLD VENIPUNCTURE: CPT

## 2022-09-27 RX ORDER — BLOOD-GLUCOSE METER
1 EACH MISCELLANEOUS AS DIRECTED
COMMUNITY
Start: 2022-08-18

## 2022-09-27 NOTE — TELEPHONE ENCOUNTER
Addended by: PAMELA HERNANDEZ on: 4/24/2017 10:07 AM     Modules accepted: Orders     Scheduled for:  Colonoscopy [address]  Provider Name: Dr Ra Snow   Date:  Wed 11/16/2022  Location: [address] [place] [address],[facility] 1   Sedation: MAC   Time: 2:45 pm   Prep: split golytely  Meds/Allergies Reconciled?: NKDA   Diagnosis with codes: Abdominal pain R10.9, left quadrant R10.10, CRC screening Z12.11  Was patient informed to call insurance with codes (Y/N): Yes  Referral sent?: Yes   [facility] or [id number]  notified?: I sent an electronic request to Endo Scheduling and received a confirmation today. Medication Orders: Hold Metformin the night before and morning of the procedure  Pt is aware to NOT take iron pills, herbal meds and diet supplements for 7 days before exam. Also to NOT take any form of alcohol, recreational drugs and any forms of ED meds 24 hours before exam.     Misc Orders: Patient was informed that they will need a COVID 19 test prior to their procedure. Patient verbally understood & will await a phone call from [facility] to schedule. Further instructions given by staff:  Instructions given and pt verbalized understanding

## 2022-09-27 NOTE — PATIENT INSTRUCTIONS
1. Schedule EGD and colonoscopy with anesthesia [Diagnosis: left sided abd pain, upper abdominal pain and flank pain, CRC screening]    2.  bowel prep from pharmacy (split dose golytely)    3. Medication adjustment:       A. Hold metformin the night before and morning of the procedure    4. Read all bowel prep instructions carefully    5. AVOID seeds, nuts, popcorn, raw fruits and vegetables (cooked is okay) for 2-3 days before procedure    6. You will need to go for COVID testing 72 hours before procedure. The testing team will call you a few days before your procedure to notify you where/when you can get COVID testing. If you do not go for COVID testing, the procedure cannot be performed. 7. If you start any NEW medication after your visit today, please notify us. Certain medications will need to be held before the procedure, or the procedure cannot be performed. 8. Please have the blood work and H. Pylori breath test.     9. Start Benefiber 2 scoops daily for constipation seen on CT.

## 2022-09-28 DIAGNOSIS — R74.8 ELEVATED ALKALINE PHOSPHATASE LEVEL: Primary | ICD-10-CM

## 2022-09-28 LAB
A1AT SERPL-MCNC: 117 MG/DL (ref 90–200)
CERULOPLASMIN SERPL-MCNC: 36.1 MG/DL (ref 20–60)

## 2022-09-29 LAB
H. PYLORI BREATH TEST: POSITIVE
NUCLEAR IGG TITR SER IF: POSITIVE {TITER}

## 2022-09-30 LAB
ANA NUCLEOLAR TITR SER IF: 160 {TITER}
MITOCHONDRIA AB TITR SER: <20 {TITER}
SMOOTH MUSCLE AB TITR SER: <20 {TITER}
TTG IGA SER-ACNC: 0.8 U/ML (ref ?–7)

## 2022-11-11 NOTE — OR NURSING
[place] 11[place]14/2022 in the evening. Patient scheduled for a rapid COVID test for Tuesday 11/15/22. Patient aware if COVID test comes back positive the procedure would have be cancelled and rescheduled for a later time. Patient aware and agreeable.

## 2022-11-15 ENCOUNTER — LAB ENCOUNTER (OUTPATIENT)
Dept: LAB | Facility: HOSPITAL | Age: 47
End: 2022-11-15
Attending: INTERNAL MEDICINE
Payer: COMMERCIAL

## 2022-11-15 DIAGNOSIS — Z01.818 PRE-OP TESTING: ICD-10-CM

## 2022-11-15 LAB — SARS-COV-2 RNA RESP QL NAA+PROBE: NOT DETECTED

## 2022-11-16 ENCOUNTER — HOSPITAL ENCOUNTER (OUTPATIENT)
Age: 47
Setting detail: HOSPITAL OUTPATIENT SURGERY
Discharge: HOME | End: 2022-11-16
Attending: INTERNAL MEDICINE | Admitting: INTERNAL MEDICINE
Payer: COMMERCIAL

## 2022-11-16 ENCOUNTER — ANESTHESIA EVENT (OUTPATIENT)
Dept: ENDOSCOPY | Age: 47
End: 2022-11-16
Payer: COMMERCIAL

## 2022-11-16 ENCOUNTER — ANESTHESIA (OUTPATIENT)
Dept: ENDOSCOPY | Age: 47
End: 2022-11-16
Payer: COMMERCIAL

## 2022-11-16 VITALS
WEIGHT: 161 LBS | BODY MASS INDEX: 31.61 KG/M2 | HEART RATE: 59 BPM | OXYGEN SATURATION: 99 % | HEIGHT: 60 IN | SYSTOLIC BLOOD PRESSURE: 112 MMHG | DIASTOLIC BLOOD PRESSURE: 80 MMHG | RESPIRATION RATE: 19 BRPM

## 2022-11-16 DIAGNOSIS — Z01.818 PRE-OP TESTING: Primary | ICD-10-CM

## 2022-11-16 DIAGNOSIS — R10.9 ABDOMINAL PAIN, UNSPECIFIED ABDOMINAL LOCATION: ICD-10-CM

## 2022-11-16 DIAGNOSIS — R10.10 UPPER ABDOMINAL PAIN: ICD-10-CM

## 2022-11-16 DIAGNOSIS — Z12.11 COLON CANCER SCREENING: ICD-10-CM

## 2022-11-16 LAB — GLUCOSE BLDC GLUCOMTR-MCNC: 91 MG/DL (ref 70–99)

## 2022-11-16 PROCEDURE — 43235 EGD DIAGNOSTIC BRUSH WASH: CPT | Performed by: INTERNAL MEDICINE

## 2022-11-16 PROCEDURE — 82962 GLUCOSE BLOOD TEST: CPT

## 2022-11-16 PROCEDURE — 45378 DIAGNOSTIC COLONOSCOPY: CPT | Performed by: INTERNAL MEDICINE

## 2022-11-16 RX ORDER — LIDOCAINE HYDROCHLORIDE 10 MG/ML
INJECTION, SOLUTION EPIDURAL; INFILTRATION; INTRACAUDAL; PERINEURAL AS NEEDED
Status: DISCONTINUED | OUTPATIENT
Start: 2022-11-16 | End: 2022-11-16 | Stop reason: SURG

## 2022-11-16 RX ORDER — GLYCOPYRROLATE 0.2 MG/ML
INJECTION, SOLUTION INTRAMUSCULAR; INTRAVENOUS AS NEEDED
Status: DISCONTINUED | OUTPATIENT
Start: 2022-11-16 | End: 2022-11-16 | Stop reason: SURG

## 2022-11-16 RX ORDER — SODIUM CHLORIDE, SODIUM LACTATE, POTASSIUM CHLORIDE, CALCIUM CHLORIDE 600; 310; 30; 20 MG/100ML; MG/100ML; MG/100ML; MG/100ML
INJECTION, SOLUTION INTRAVENOUS CONTINUOUS
Status: DISCONTINUED | OUTPATIENT
Start: 2022-11-16 | End: 2022-11-16

## 2022-11-16 RX ADMIN — GLYCOPYRROLATE 0.2 MG: 0.2 INJECTION, SOLUTION INTRAMUSCULAR; INTRAVENOUS at 14:23:00

## 2022-11-16 RX ADMIN — LIDOCAINE HYDROCHLORIDE 50 MG: 10 INJECTION, SOLUTION EPIDURAL; INFILTRATION; INTRACAUDAL; PERINEURAL at 14:23:00

## 2022-11-16 RX ADMIN — SODIUM CHLORIDE, SODIUM LACTATE, POTASSIUM CHLORIDE, CALCIUM CHLORIDE: 600; 310; 30; 20 INJECTION, SOLUTION INTRAVENOUS at 14:23:00

## 2022-11-16 NOTE — ANESTHESIA POSTPROCEDURE EVALUATION
Patient: Siddhartha Sheikh    Procedure Summary     Date: 11/16/22 Room / Location: [facility] 01 / [address] [facility] 1 ENDO    Anesthesia Start: 1026 Anesthesia Stop: 8049    Procedures:       COLONOSCOPY      ESOPHAGOGASTRODUODENOSCOPY (EGD) Diagnosis:       Abdominal pain, unspecified abdominal location      Upper abdominal pain      Colon cancer screening      (melanosis coli, internal hemorrhoids, normal egd)    Surgeons: Lesley Bailey MD Anesthesiologist:     Anesthesia Type: MAC ASA Status: 2          Anesthesia Type: MAC    Vitals Value Taken Time   BP 97/71 11/16/22 1453   Temp  11/16/22 1453   Pulse 80 11/16/22 1453   Resp 17 11/16/22 1453   SpO2 98 % 11/16/22 1453       [facility] AN Post Evaluation:   Patient Evaluated in PACU  Patient Participation: waiting for patient participation  Pain Management: adequate  Airway Patency:patent  Yes    Cardiovascular Status: acceptable  Respiratory Status: acceptable  Postoperative Hydration acceptable      Mackenzie Bearden MD  11/16/2022 2:53 PM

## 2022-11-16 NOTE — DISCHARGE INSTRUCTIONS
Home Care Instructions for Colonoscopy and/or Gastroscopy with Sedation    Diet:  - Resume your regular diet as tolerated unless otherwise instructed. - Start with light meals to minimize bloating.  - Do not drink alcohol today. Medication:  - If you have questions about resuming your normal medications, please contact your Primary Care Physician. Activities:  - Take it easy today. Do not return to work today. - Do not drive today. - Do not operate any machinery today (including kitchen equipment). Colonoscopy:  - You may notice some rectal \"spotting\" (a little blood on the toilet tissue) for a day or two after the exam. This is normal.  - If you experience any rectal bleeding (not spotting), persistent tenderness or sharp severe abdominal pains, oral temperature over 100 degrees Fahrenheit, light-headedness or dizziness, or any other problems, contact your doctor. Gastroscopy:  - You may have a sore throat for 2-3 days following the exam. This is normal. Gargling with warm salt water (1/2 tsp salt to 1 glass warm water) or using throat lozenges will help. - If you experience any sharp pain in your neck, abdomen or chest, vomiting of blood, oral temperature over 100 degrees Fahrenheit, light-headedness or dizziness, or any other problems, contact your doctor. **If unable to reach your doctor, please go to the [facility]**    - Your referring physician will receive a full report of your examination.  - If you do not hear from your doctor's office within two weeks of your biopsy, please call them for your results. You may be able to see your laboratory results in [employer] between 4 and 7 business days. In some cases, your physician may not have viewed the results before they are released to [employer]. If you have questions regarding your results contact the physician who ordered the test/exam by phone or via [employer] by choosing \"Ask a Medical Question. \"

## 2022-11-17 ENCOUNTER — TELEPHONE (OUTPATIENT)
Dept: GASTROENTEROLOGY | Facility: CLINIC | Age: 47
End: 2022-11-17

## 2022-11-17 NOTE — TELEPHONE ENCOUNTER
Health Maintenance Updated. 10 year colonoscopy recall entered into patient outreach in [facility]. Next colonoscopy will be due 11/16/2032.

## 2022-11-17 NOTE — OPERATIVE REPORT
[facility] Endoscopy Report      Preoperative Diagnosis:  - colon cancer screening  - left flank pain      Postoperative Diagnosis:  - melanosis coli  - small internal hemorrhoids  - normal EGD      Procedure:    Colonoscopy   Esophagogastroduodenoscopy       Surgeon:  Pedro Ballard M.D. Anesthesia:  MAC sedation     Technique:  After informed consent, the patient was placed in the left lateral recumbent position. Digital rectal examination revealed no palpable intraluminal abnormalities. An Olympus variable stiffness 190 series HD colonoscope was inserted into the rectum and advanced under direct vision by following the lumen to the cecum. The colon was examined upon withdrawal in the left lateral position. Following colonoscopy, an Olympus adult HD gastroscope was inserted into the hypopharynx and advanced under direct vision into the esophagus, stomach and duodenum. The endoscope was withdrawn to the stomach where retroflexion of the annulus, body, cardia and fundus was performed. The instrument was straightened, insufflated air and fluid were suctioned and the endoscope was withdrawn. The procedures were well tolerated without immediate complication. Findings:  The preparation of the colon was good. The terminal ileum was examined for 4 cm and visually normal.  The ileocecal valve was well preserved. The visualized colonic mucosa in the right side of the colon showed subtle changes of melanosis coli. No polyps or lesions were noted. Small internal hemorrhoids were noted on retroflexed view. The esophagus was normal.  The GE junction and diaphragmatic impression were at 39 cm. The stomach distended appropriately with insufflated air.   The mucosa of the stomach including cardia, fundus, gastric body and antrum were normal.  The duodenal bulb and post bulbar regions were normal.    Estimated blood loss-none  Specimens-none    Impression:  - melanosis coli  - small internal hemorrhoids  - normal EGD    Recommendations:  - Post procedure instructions given  - Repeat colonoscopy in 10 years  - Symptomatic treatment of hemorrhoids  - Follow up with primary doctor, no cause of left flank pain noted          Kimberly Guzmán.  Miguel Mcgowan MD  11/16/2022  7:21 PM

## 2022-12-02 ENCOUNTER — MED REC SCAN ONLY (OUTPATIENT)
Facility: CLINIC | Age: 47
End: 2022-12-02

## 2022-12-06 ENCOUNTER — OFFICE VISIT (OUTPATIENT)
Dept: OPHTHALMOLOGY | Facility: CLINIC | Age: 47
End: 2022-12-06
Payer: COMMERCIAL

## 2022-12-06 DIAGNOSIS — H52.203 MYOPIA OF BOTH EYES WITH ASTIGMATISM AND PRESBYOPIA: ICD-10-CM

## 2022-12-06 DIAGNOSIS — H43.391 FLOATER, VITREOUS, RIGHT: ICD-10-CM

## 2022-12-06 DIAGNOSIS — H52.13 MYOPIA OF BOTH EYES WITH ASTIGMATISM AND PRESBYOPIA: ICD-10-CM

## 2022-12-06 DIAGNOSIS — H52.4 MYOPIA OF BOTH EYES WITH ASTIGMATISM AND PRESBYOPIA: ICD-10-CM

## 2022-12-06 DIAGNOSIS — E11.9 DIABETES MELLITUS TYPE 2 WITHOUT RETINOPATHY (HCC): Primary | ICD-10-CM

## 2022-12-06 PROCEDURE — 92015 DETERMINE REFRACTIVE STATE: CPT | Performed by: OPHTHALMOLOGY

## 2022-12-06 PROCEDURE — 2023F DILAT RTA XM W/O RTNOPTHY: CPT | Performed by: OPHTHALMOLOGY

## 2022-12-06 PROCEDURE — 92004 COMPRE OPH EXAM NEW PT 1/>: CPT | Performed by: OPHTHALMOLOGY

## 2022-12-06 NOTE — PATIENT INSTRUCTIONS
Diabetes mellitus type 2 without retinopathy ([facility] [address]  Diabetes type II: no background of retinopathy, no signs of neovascularization noted. Discussed ocular and systemic benefits of blood sugar control. Diagnosis and treatment discussed in detail with patient. Myopia of both eyes with astigmatism and presbyopia  Recommend bifocals; RX written. Floater, vitreous, right   There is no evidence of retinal pathology. All signs and symptoms of retinal detachment/tears explained in detail. Patient instructed to call the office if they experience increase in floaters, increase in flashes of light, loss of vision or curtain or veil effect.

## 2023-03-06 ENCOUNTER — TELEPHONE (OUTPATIENT)
Dept: GASTROENTEROLOGY | Facility: CLINIC | Age: 48
End: 2023-03-06

## 2023-03-06 NOTE — TELEPHONE ENCOUNTER
1st,reminder overdue letter mailed out to patient     Labs order:    Tamia Torres (Order #[id number]) on 9/28/22
CONST: no fevers, no chills  EYES: no pain, + transient vision changes  ENT: no sore throat, no ear pain, no change in hearing  CV: no chest pain, no leg swelling  RESP: no shortness of breath, no cough  ABD: no abdominal pain, no nausea, no vomiting, no diarrhea  : no dysuria, no flank pain, no hematuria  MSK: no back pain, no extremity pain  NEURO: + headache, no additional neurologic complaints  HEME: no easy bleeding  SKIN:  no rash

## 2024-11-18 ENCOUNTER — OFFICE VISIT (OUTPATIENT)
Dept: OBGYN CLINIC | Facility: CLINIC | Age: 49
End: 2024-11-18

## 2024-11-18 VITALS
WEIGHT: 204 LBS | HEART RATE: 75 BPM | HEIGHT: 57 IN | BODY MASS INDEX: 44.01 KG/M2 | SYSTOLIC BLOOD PRESSURE: 122 MMHG | DIASTOLIC BLOOD PRESSURE: 78 MMHG

## 2024-11-18 DIAGNOSIS — Z01.419 WOMEN'S ANNUAL ROUTINE GYNECOLOGICAL EXAMINATION: Primary | ICD-10-CM

## 2024-11-18 DIAGNOSIS — Z12.31 BREAST CANCER SCREENING BY MAMMOGRAM: ICD-10-CM

## 2024-11-18 DIAGNOSIS — N95.1 VAGINAL DRYNESS, MENOPAUSAL: ICD-10-CM

## 2024-11-18 DIAGNOSIS — Z12.4 PAPANICOLAOU SMEAR FOR CERVICAL CANCER SCREENING: ICD-10-CM

## 2024-11-18 PROCEDURE — 3008F BODY MASS INDEX DOCD: CPT | Performed by: ADVANCED PRACTICE MIDWIFE

## 2024-11-18 PROCEDURE — 3074F SYST BP LT 130 MM HG: CPT | Performed by: ADVANCED PRACTICE MIDWIFE

## 2024-11-18 PROCEDURE — 99396 PREV VISIT EST AGE 40-64: CPT | Performed by: ADVANCED PRACTICE MIDWIFE

## 2024-11-18 PROCEDURE — 3078F DIAST BP <80 MM HG: CPT | Performed by: ADVANCED PRACTICE MIDWIFE

## 2024-11-18 NOTE — PROGRESS NOTES
José May is a 48 year old female.    HPI:       José May is a 48 year old female.   No LMP recorded. (Menstrual status: Tubal Ligation).    Chief Complaint   Patient presents with    Annual     Annual; last pap , vaginal dryness        No menses x 4 yrs. Does report some vaginal dryness. Sometimes painful with intercourse.     Tubal ligation for birth control.     Monogamous relationship.   Denies abuse. Feels safe.  Declines STD testing    Last pap: 2018, NIL, HPV Neg. Hx of abnormal pap -no  Mammogram: -  Last in     Family hx of breast, endometrial or ovarian CA: No        Health Maintenance   Topic Date Due    Mammogram  2019                         Note: This is a gyn only visit. Pt  is referred back to PCP for care of any non-gyn concerns listed above and  in the Problem List.      HISTORY:  Past Medical History:    Diabetes (HCC)    TMJ click      Past Surgical History:   Procedure Laterality Date    Cholecystectomy      Colonoscopy N/A 2022    Procedure: COLONOSCOPY;  Surgeon: Chele Regalado MD;  Location: [facility] ENDO    Laparoscopic cholecystectomy N/A 2019    Removal gallbladder  2020    Tubal ligation Bilateral 1998      Family History   Problem Relation Age of Onset    Diabetes Mother     Cancer Mother 52        pancreatic    Heart Disorder Father     Heart Attack Father     Lipids Sister     Glaucoma Neg     Macular degeneration Neg       Social History:   Social History     Socioeconomic History    Marital status:     Number of children: 4   Occupational History    Occupation: customer service     Employer: [employer]   Tobacco Use    Smoking status: Never    Smokeless tobacco: Never   Vaping Use    Vaping status: Never Used   Substance and Sexual Activity    Alcohol use: No     Alcohol/week: 0.0 standard drinks of alcohol     Comment: occ    Drug use: No    Sexual activity: Yes        Medications (Active prior to today's visit):  Current  Outpatient Medications   Medication Sig Dispense Refill    Blood Glucose Monitoring Suppl (CONTOUR NEXT EZ) w/Device Does not apply Kit Take 1 Bottle by mouth As Directed.      Glucose Blood (FREESTYLE TEST) In Vitro Strip Test 3 times a day 300 strip 3    Blood Gluc Meter Disp-Strips Does not apply Device 1 package of strips 30 each 0    Lancets 30G Does not apply Misc Once QAM 30 each 0    traMADol 50 MG Oral Tab Take 1 tablet (50 mg total) by mouth every 6 (six) hours as needed for Pain. (Patient not taking: Reported on 11/18/2024) 5 tablet 0    metFORMIN 500 MG Oral Tab Take 1 tablet (500 mg total) by mouth 2 (two) times daily with meals. (Patient not taking: Reported on 11/18/2024) 60 tablet 2    FreeStyle System Does not apply Kit 1 each as needed for Other. 1 each 0       Allergies:  Allergies[1]      ROS:     Review of Systems   Constitutional: Negative.    Respiratory: Negative.     Cardiovascular: Negative.    Gastrointestinal: Negative.    Genitourinary:  Positive for dyspareunia (sometimes due to dryness) and vaginal pain (dryness). Negative for decreased urine volume, difficulty urinating, dysuria, enuresis, flank pain, frequency, genital sores, hematuria, menstrual problem, pelvic pain, urgency, vaginal bleeding and vaginal discharge.        PHYSICAL EXAM:     Vitals:    11/18/24 1206   BP: 122/78   Pulse: 75     Physical Exam  Constitutional:       General: She is not in acute distress.     Appearance: Normal appearance. She is not ill-appearing.   Pulmonary:      Effort: Pulmonary effort is normal.   Chest:   Breasts:     Right: No swelling, bleeding, inverted nipple, mass, nipple discharge, skin change or tenderness.      Left: No swelling, bleeding, inverted nipple, mass, nipple discharge, skin change or tenderness.   Genitourinary:     General: Normal vulva.      Labia:         Right: No rash, tenderness, lesion or injury.         Left: No rash, tenderness, lesion or injury.       Vagina: No signs  of injury and foreign body. No vaginal discharge, erythema, tenderness, bleeding, lesions or prolapsed vaginal walls.      Cervix: No cervical motion tenderness, discharge, friability, lesion, erythema, cervical bleeding or eversion.   Neurological:      Mental Status: She is oriented to person, place, and time.   Psychiatric:         Mood and Affect: Mood normal.         Behavior: Behavior normal.         Thought Content: Thought content normal.          ASSESSMENT/PLAN:      Diagnoses and all orders for this visit:    Women's annual routine gynecological examination  -     ThinPrep PAP Smear; Future  -     THINPREP PAP SMEAR ONLY    Papanicolaou smear for cervical cancer screening  -     Hpv Dna  High Risk , Thin Prep Collect; Future    Breast cancer screening by mammogram  -     [facility] CLINT 2D+3D SCREENING BILAT (CPT=77067/71302); Future    Vaginal dryness, menopausal        Normal gyne exam. Pap with HPV to lab  Reviewed PAP guidelines recommending q 5 year screening with HPV testing.   Discussed breast awareness and SBE, and recommendations regarding mammography.  Order provided for screening mammogram.  Working on diet  Discussed Calcium and Vitamin D to maintain bone density  Discussed screening colonoscopy- had done in 2022  Offered patient STI screening and she declines     Discussed vaginal dryness with lack of estrogen and atrophy of tissues. Recommend vaginal moisturizers which can be bought OTC. Use 3 times per week and use of lubricants during intercourse. Also company [employer] makes supplements to help with vaginal dryness. If no relief with above then RTC and can consider vaginal estrogen therapy.     F/U 1 yr or PRN         11/18/2024  Ernestine Chavez CNM          [1] No Known Allergies

## 2024-11-19 LAB — HPV E6+E7 MRNA CVX QL NAA+PROBE: NEGATIVE

## 2024-12-05 ENCOUNTER — HOSPITAL ENCOUNTER (OUTPATIENT)
Dept: GENERAL RADIOLOGY | Age: 49
Discharge: HOME | End: 2024-12-05
Payer: COMMERCIAL

## 2024-12-05 ENCOUNTER — OFFICE VISIT (OUTPATIENT)
Dept: FAMILY MEDICINE CLINIC | Facility: CLINIC | Age: 49
End: 2024-12-05

## 2024-12-05 VITALS
HEIGHT: 57 IN | WEIGHT: 202 LBS | DIASTOLIC BLOOD PRESSURE: 80 MMHG | TEMPERATURE: 98 F | BODY MASS INDEX: 43.58 KG/M2 | RESPIRATION RATE: 20 BRPM | OXYGEN SATURATION: 98 % | HEART RATE: 63 BPM | SYSTOLIC BLOOD PRESSURE: 126 MMHG

## 2024-12-05 DIAGNOSIS — J02.9 SORE THROAT: ICD-10-CM

## 2024-12-05 DIAGNOSIS — J22 ACUTE RESPIRATORY INFECTION: Primary | ICD-10-CM

## 2024-12-05 DIAGNOSIS — R05.1 ACUTE COUGH: ICD-10-CM

## 2024-12-05 LAB
CONTROL LINE PRESENT WITH A CLEAR BACKGROUND (YES/NO): YES YES/NO
KIT LOT #: NORMAL NUMERIC

## 2024-12-05 PROCEDURE — 71046 X-RAY EXAM CHEST 2 VIEWS: CPT

## 2024-12-05 PROCEDURE — 3079F DIAST BP 80-89 MM HG: CPT

## 2024-12-05 PROCEDURE — 3008F BODY MASS INDEX DOCD: CPT

## 2024-12-05 PROCEDURE — 3074F SYST BP LT 130 MM HG: CPT

## 2024-12-05 PROCEDURE — 99213 OFFICE O/P EST LOW 20 MIN: CPT

## 2024-12-05 PROCEDURE — 87880 STREP A ASSAY W/OPTIC: CPT

## 2024-12-05 RX ORDER — BENZONATATE 200 MG/1
200 CAPSULE ORAL 3 TIMES DAILY PRN
Qty: 30 CAPSULE | Refills: 0 | Status: SHIPPED | OUTPATIENT
Start: 2024-12-05

## 2024-12-05 RX ORDER — AZITHROMYCIN 250 MG/1
TABLET, FILM COATED ORAL
Qty: 6 TABLET | Refills: 0 | Status: SHIPPED | OUTPATIENT
Start: 2024-12-05 | End: 2024-12-10

## 2024-12-05 RX ORDER — PREDNISONE 20 MG/1
20 TABLET ORAL 2 TIMES DAILY
Qty: 10 TABLET | Refills: 0 | Status: SHIPPED | OUTPATIENT
Start: 2024-12-05 | End: 2024-12-10

## 2024-12-05 NOTE — PROGRESS NOTES
Subjective:   José May is a 49 year old female who presents for Cough (Pt states she been having cough for 2 weeks and has been having sore throat and ear pain pt mohit body aches, chills or fever. ).     Patient c/o upper respiratory symptoms for 2  weeks. Patient reports sore throat, congestion, green colored nasal discharge, cough with green colored sputum, cough is keeping pt up at night, chest pain from coughing, sinus pain, wheezing, OTC cold meds have not been helping, denies fever.        History/Other:      Chief Complaint Reviewed and Verified  Nursing Notes Reviewed and   Verified  Tobacco Reviewed  Allergies Reviewed  Medications Reviewed    Problem List Reviewed  Medical History Reviewed  Surgical History   Reviewed  OB Status Reviewed  Family History Reviewed  Social History   Reviewed           Tobacco:  She has never smoked tobacco.      Current Outpatient Medications   Medication Sig Dispense Refill    benzonatate 200 MG Oral Cap Take 1 capsule (200 mg total) by mouth 3 (three) times daily as needed for cough. 30 capsule 0    azithromycin (ZITHROMAX Z-HENRIETTA) 250 MG Oral Tab Take 2 tablets (500 mg total) by mouth daily for 1 day, THEN 1 tablet (250 mg total) daily for 4 days. 6 tablet 0    predniSONE 20 MG Oral Tab Take 1 tablet (20 mg total) by mouth 2 (two) times daily for 5 days. 10 tablet 0    Blood Glucose Monitoring Suppl (CONTOUR NEXT EZ) w/Device Does not apply Kit Take 1 Bottle by mouth As Directed.      Glucose Blood (FREESTYLE TEST) In Vitro Strip Test 3 times a day 300 strip 3    Blood Gluc Meter Disp-Strips Does not apply Device 1 package of strips 30 each 0    Lancets 30G Does not apply Misc Once QAM 30 each 0    traMADol 50 MG Oral Tab Take 1 tablet (50 mg total) by mouth every 6 (six) hours as needed for Pain. (Patient not taking: Reported on 12/6/2022) 5 tablet 0    metFORMIN 500 MG Oral Tab Take 1 tablet (500 mg total) by mouth 2 (two) times daily with meals.  (Patient not taking: Reported on 12/6/2022) 60 tablet 2       Allergies[1]      Review of Systems   Constitutional: Negative.  Negative for activity change and fatigue.   HENT:  Positive for congestion, postnasal drip, rhinorrhea, sinus pressure, sneezing and sore throat. Negative for ear pain.    Eyes: Negative.  Negative for redness.   Respiratory:  Positive for cough, shortness of breath and wheezing.    Cardiovascular: Negative.  Negative for chest pain.   Gastrointestinal:  Positive for diarrhea. Negative for abdominal pain, constipation, nausea and vomiting.   Endocrine: Negative.    Genitourinary: Negative.  Negative for difficulty urinating and frequency.   Musculoskeletal:  Positive for arthralgias and myalgias. Negative for back pain and joint swelling.   Skin: Negative.  Negative for rash.   Allergic/Immunologic: Negative.    Neurological:  Positive for headaches. Negative for dizziness, syncope and light-headedness.   Hematological: Negative.    Psychiatric/Behavioral: Negative.           Objective:   /80 (BP Location: Right arm, Patient Position: Sitting, Cuff Size: large)   Pulse 63   Temp 98.3 °F (36.8 °C) (Oral)   Resp 20   Ht 4' 9\" (1.448 m)   Wt 202 lb (91.6 kg)   SpO2 98%   BMI 43.71 kg/m²  Estimated body mass index is 43.71 kg/m² as calculated from the following:    Height as of this encounter: 4' 9\" (1.448 m).    Weight as of this encounter: 202 lb (91.6 kg).      Physical Exam  Vitals and nursing note reviewed.   Constitutional:       Appearance: Normal appearance. She is obese.   HENT:      Head: Normocephalic and atraumatic.      Right Ear: Tympanic membrane normal.      Left Ear: Tympanic membrane normal.      Nose: Nasal tenderness and congestion present.      Right Turbinates: Enlarged.      Left Turbinates: Enlarged.      Right Sinus: Frontal sinus tenderness present.      Left Sinus: Frontal sinus tenderness present.      Mouth/Throat:      Lips: Pink.      Mouth: Mucous  membranes are moist.      Pharynx: Posterior oropharyngeal erythema and postnasal drip present.   Eyes:      Extraocular Movements: Extraocular movements intact.      Conjunctiva/sclera: Conjunctivae normal.      Pupils: Pupils are equal, round, and reactive to light.   Cardiovascular:      Rate and Rhythm: Normal rate and regular rhythm.      Pulses: Normal pulses.      Heart sounds: Normal heart sounds.   Pulmonary:      Effort: Pulmonary effort is normal.      Breath sounds: Examination of the right-upper field reveals rales. Examination of the left-upper field reveals rales. Rales present.   Chest:      Chest wall: Tenderness present.   Abdominal:      General: Abdomen is flat. Bowel sounds are normal.      Palpations: Abdomen is soft.   Musculoskeletal:         General: Normal range of motion.      Cervical back: Normal range of motion and neck supple.   Skin:     General: Skin is warm and dry.   Neurological:      General: No focal deficit present.      Mental Status: She is alert and oriented to person, place, and time. Mental status is at baseline.   Psychiatric:         Mood and Affect: Mood normal.         Behavior: Behavior normal.         Thought Content: Thought content normal.         Judgment: Judgment normal.           Assessment & Plan:     1. Acute respiratory infection  - benzonatate 200 MG Oral Cap; Take 1 capsule (200 mg total) by mouth 3 (three) times daily as needed for cough.  Dispense: 30 capsule; Refill: 0  - azithromycin (ZITHROMAX Z-HENRIETTA) 250 MG Oral Tab; Take 2 tablets (500 mg total) by mouth daily for 1 day, THEN 1 tablet (250 mg total) daily for 4 days.  Dispense: 6 tablet; Refill: 0  - predniSONE 20 MG Oral Tab; Take 1 tablet (20 mg total) by mouth 2 (two) times daily for 5 days.  Dispense: 10 tablet; Refill: 0  -Advised to take medication as directed  -Symptomatic treatment including salt water gargling, plenty of fluids, rest, humidified air  -Other pharmacological symptomatic  treatment including NyQuil and chloraseptic sprays for pain control.    2. Acute cough  - XR CHEST PA + LAT CHEST (CPT=71046); Future  -Encouraged symptomatic support with humidifier, cough drops, honey, and warm fluids  -Can use Robitussin every 4 hours as needed  -Increase hydration and rest as tolerated  -Discussed about red flag for urgent reevaluation.      3. Sore throat  - Strep A Assay W/Optic         Medication use, effects and side effects discussed in detail with patient. The patient  indicated understanding of the diagnosis and agreed with the plan of care.    Return if symptoms worsen or fail to improve.    Julia Byrd, APRN       [1] No Known Allergies

## 2024-12-20 ENCOUNTER — HOSPITAL ENCOUNTER (OUTPATIENT)
Dept: MAMMOGRAPHY | Age: 49
Discharge: HOME | End: 2024-12-20
Attending: ADVANCED PRACTICE MIDWIFE
Payer: COMMERCIAL

## 2024-12-20 DIAGNOSIS — Z12.31 BREAST CANCER SCREENING BY MAMMOGRAM: ICD-10-CM

## 2024-12-20 PROCEDURE — 77067 SCR MAMMO BI INCL CAD: CPT | Performed by: ADVANCED PRACTICE MIDWIFE

## 2024-12-20 PROCEDURE — 77063 BREAST TOMOSYNTHESIS BI: CPT | Performed by: ADVANCED PRACTICE MIDWIFE

## 2024-12-21 DIAGNOSIS — R92.333 HETEROGENEOUSLY DENSE TISSUE OF BOTH BREASTS ON MAMMOGRAPHY: ICD-10-CM

## 2024-12-21 DIAGNOSIS — R92.30 DENSE BREASTS: Primary | ICD-10-CM

## 2024-12-27 ENCOUNTER — TELEPHONE (OUTPATIENT)
Dept: FAMILY MEDICINE CLINIC | Facility: CLINIC | Age: 49
End: 2024-12-27

## 2024-12-27 NOTE — TELEPHONE ENCOUNTER
(Message Delivered)   D E L I V E R I E S :  2024 04:37p           LANISEJ       Delivered  ======================================================================  Paging    Message # [phone]         2024 04:49p   [[email or web address]]  To:  From:  [place]  Primary MD:  Phone#:  ----------------------------------------------------------------------  CORINNA BLACKWELL [phone]  73 PT WAS GIVEN MEDS FOR PNUEMONIA AND SOAR THORAT AND THEY ARE NOT WORKING PT IS STILL FEELING SICK Paged at number :  PAGE: [phone] at : Dec-  16:49

## 2025-03-10 ENCOUNTER — OFFICE VISIT (OUTPATIENT)
Dept: FAMILY MEDICINE CLINIC | Facility: CLINIC | Age: 50
End: 2025-03-10

## 2025-03-10 VITALS
HEART RATE: 91 BPM | WEIGHT: 197 LBS | SYSTOLIC BLOOD PRESSURE: 120 MMHG | BODY MASS INDEX: 42.5 KG/M2 | DIASTOLIC BLOOD PRESSURE: 79 MMHG | TEMPERATURE: 98 F | OXYGEN SATURATION: 97 % | HEIGHT: 57 IN

## 2025-03-10 DIAGNOSIS — J22 ACUTE RESPIRATORY INFECTION: Primary | ICD-10-CM

## 2025-03-10 PROCEDURE — 99213 OFFICE O/P EST LOW 20 MIN: CPT

## 2025-03-10 PROCEDURE — 3074F SYST BP LT 130 MM HG: CPT

## 2025-03-10 PROCEDURE — 3008F BODY MASS INDEX DOCD: CPT

## 2025-03-10 PROCEDURE — 3078F DIAST BP <80 MM HG: CPT

## 2025-03-10 RX ORDER — FLUTICASONE PROPIONATE 50 MCG
1 SPRAY, SUSPENSION (ML) NASAL DAILY
Qty: 16 G | Refills: 0 | Status: SHIPPED | OUTPATIENT
Start: 2025-03-10

## 2025-03-10 RX ORDER — BENZONATATE 200 MG/1
200 CAPSULE ORAL 3 TIMES DAILY PRN
Qty: 30 CAPSULE | Refills: 0 | Status: SHIPPED | OUTPATIENT
Start: 2025-03-10

## 2025-03-10 NOTE — PROGRESS NOTES
Subjective:   José May is a 49 year old female who presents for Cough (Pt has been having a cough, sore throat, chest pains and body aches pt states she also has right ear pain no fever started on 3/6 ). Patient is here today with Spouse.    Patient presents with uri symptoms  5 day(s) ago  Fever - No,   Cough - Yes, productive - Yes, color - yellow  Sick contacts - No  Travel - No  Other symptoms - sore throat, congestion, yellow colored nasal discharge, cough is keeping pt up at night, chest pain from coughing, ear pain, OTC cold meds have not been helping, denies fever, denies sinus pain      History/Other:      Chief Complaint Reviewed and Verified  Nursing Notes Reviewed and   Verified  Tobacco Reviewed  Allergies Reviewed  Medications Reviewed    Problem List Reviewed  Medical History Reviewed  Surgical History   Reviewed  OB Status Reviewed  Family History Reviewed  Social History   Reviewed           Tobacco:  She has never smoked tobacco.      Current Outpatient Medications   Medication Sig Dispense Refill    benzonatate 200 MG Oral Cap Take 1 capsule (200 mg total) by mouth 3 (three) times daily as needed for cough. 30 capsule 0    fluticasone propionate 50 MCG/ACT Nasal Suspension 1 spray by Nasal route daily. One spray per each nostril daily. 16 g 0    Blood Glucose Monitoring Suppl (CONTOUR NEXT EZ) w/Device Does not apply Kit Take 1 Bottle by mouth As Directed. (Patient not taking: Reported on 3/10/2025)      traMADol 50 MG Oral Tab Take 1 tablet (50 mg total) by mouth every 6 (six) hours as needed for Pain. (Patient not taking: Reported on 3/10/2025) 5 tablet 0    Glucose Blood (FREESTYLE TEST) In Vitro Strip Test 3 times a day (Patient not taking: Reported on 3/10/2025) 300 strip 3    metFORMIN 500 MG Oral Tab Take 1 tablet (500 mg total) by mouth 2 (two) times daily with meals. (Patient not taking: Reported on 3/10/2025) 60 tablet 2    Blood Gluc Meter Disp-Strips Does not apply  Device 1 package of strips (Patient not taking: Reported on 3/10/2025) 30 each 0    Lancets 30G Does not apply Misc Once QAM (Patient not taking: Reported on 3/10/2025) 30 each 0       Allergies[1]    Review of Systems   Constitutional:  Positive for fatigue. Negative for activity change.   HENT:  Positive for congestion, ear pain, postnasal drip, rhinorrhea, sneezing and sore throat.    Eyes: Negative.  Negative for redness.   Respiratory:  Positive for cough and shortness of breath. Negative for wheezing.    Cardiovascular: Negative.  Negative for chest pain.   Gastrointestinal: Negative.  Negative for abdominal pain, constipation, diarrhea, nausea and vomiting.   Endocrine: Negative.    Genitourinary: Negative.  Negative for difficulty urinating and frequency.   Musculoskeletal: Negative.  Negative for back pain, joint swelling and myalgias.   Skin: Negative.  Negative for rash.   Allergic/Immunologic: Negative.    Neurological:  Negative for dizziness, syncope and light-headedness.   Hematological: Negative.    Psychiatric/Behavioral: Negative.           Objective:   /79 (BP Location: Right arm, Patient Position: Sitting, Cuff Size: large)   Pulse 91   Temp 97.8 °F (36.6 °C) (Temporal)   Ht 4' 9\" (1.448 m)   Wt 197 lb (89.4 kg)   LMP 09/15/2021   SpO2 97%   BMI 42.63 kg/m²  Estimated body mass index is 42.63 kg/m² as calculated from the following:    Height as of this encounter: 4' 9\" (1.448 m).    Weight as of this encounter: 197 lb (89.4 kg).      Physical Exam  Vitals and nursing note reviewed.   Constitutional:       Appearance: Normal appearance. She is normal weight.   HENT:      Head: Normocephalic and atraumatic.      Right Ear: Tympanic membrane normal.      Left Ear: Tympanic membrane normal.      Nose: Nasal tenderness, congestion and rhinorrhea present. Rhinorrhea is clear.      Right Sinus: Frontal sinus tenderness present.      Left Sinus: Frontal sinus tenderness present.       Mouth/Throat:      Pharynx: Posterior oropharyngeal erythema and postnasal drip present.   Eyes:      Extraocular Movements: Extraocular movements intact.      Conjunctiva/sclera: Conjunctivae normal.      Pupils: Pupils are equal, round, and reactive to light.   Cardiovascular:      Rate and Rhythm: Normal rate and regular rhythm.      Pulses: Normal pulses.      Heart sounds: Normal heart sounds.   Pulmonary:      Effort: Pulmonary effort is normal.      Breath sounds: Normal breath sounds.   Abdominal:      General: Abdomen is flat. Bowel sounds are normal.      Palpations: Abdomen is soft.   Musculoskeletal:         General: Normal range of motion.      Cervical back: Normal range of motion and neck supple.   Skin:     General: Skin is warm and dry.   Neurological:      General: No focal deficit present.      Mental Status: She is alert and oriented to person, place, and time. Mental status is at baseline.   Psychiatric:         Mood and Affect: Mood normal.         Behavior: Behavior normal.         Thought Content: Thought content normal.         Judgment: Judgment normal.           Assessment & Plan:     Assessment & Plan  Acute respiratory infection  -Advised to take medication as directed  -Drink plenty of fluids to stay hydrated - at least 2 liters per day   -Wash hands frequently, cover your cough or sneeze, do not share drinks with others.  -Cepacol lozenges, Chloroseptic throat spray, and salt water gargles (1 tsp salt in 8 oz lukewarm water) may help with throat pain and swelling  -Humidifier, steam, and Vicks as needed  -OTC Nasacort or Flonase (steroid nasal spray) - 2 sprays in each nostril once daily if needed for severe nasal congestion and post nasal drip.  -Tylenol or Ibuprofen as needed for any pain or fever  -May use over the counter antitussives like Robitussin or Delsym as needed for cough  -Explained to patient that symptoms may become worse in the next day before improving.  -Contact the  office if symptoms worsen or do not improve in a few days.     Orders:    SARS-CoV-2/Flu A and B/RSV by PCR ([facility]); Future    benzonatate 200 MG Oral Cap; Take 1 capsule (200 mg total) by mouth 3 (three) times daily as needed for cough.    fluticasone propionate 50 MCG/ACT Nasal Suspension; 1 spray by Nasal route daily. One spray per each nostril daily.       Medication use, effects and side effects discussed in detail with patient. The patient and Spouse indicated understanding of the diagnosis and agreed with the plan of care.    Return if symptoms worsen or fail to improve.    Julia Byrd, APRDAVONTE       [1] No Known Allergies

## 2025-03-11 LAB
FLUAV + FLUBV RNA SPEC NAA+PROBE: NOT DETECTED
FLUAV + FLUBV RNA SPEC NAA+PROBE: NOT DETECTED
RSV RNA SPEC NAA+PROBE: DETECTED
SARS-COV-2 RNA RESP QL NAA+PROBE: NOT DETECTED

## 2025-03-31 ENCOUNTER — OFFICE VISIT (OUTPATIENT)
Dept: OBGYN CLINIC | Facility: CLINIC | Age: 50
End: 2025-03-31
Payer: COMMERCIAL

## 2025-03-31 VITALS
BODY MASS INDEX: 42.5 KG/M2 | DIASTOLIC BLOOD PRESSURE: 80 MMHG | HEIGHT: 57 IN | SYSTOLIC BLOOD PRESSURE: 120 MMHG | WEIGHT: 197 LBS | HEART RATE: 64 BPM

## 2025-03-31 DIAGNOSIS — N94.89 VAGINAL BURNING: Primary | ICD-10-CM

## 2025-03-31 LAB
APPEARANCE: CLEAR
BILIRUBIN: NEGATIVE
GLUCOSE (URINE DIPSTICK): NEGATIVE MG/DL
KETONES (URINE DIPSTICK): NEGATIVE MG/DL
LEUKOCYTES: NEGATIVE
MULTISTIX LOT#: NORMAL NUMERIC
NITRITE, URINE: NEGATIVE
OCCULT BLOOD: NEGATIVE
PH, URINE: 6 (ref 4.5–8)
SPECIFIC GRAVITY: 1.03 (ref 1–1.03)
URINE-COLOR: YELLOW
UROBILINOGEN,SEMI-QN: 0.2 MG/DL (ref 0–1.9)

## 2025-03-31 PROCEDURE — 3008F BODY MASS INDEX DOCD: CPT | Performed by: ADVANCED PRACTICE MIDWIFE

## 2025-03-31 PROCEDURE — 3079F DIAST BP 80-89 MM HG: CPT | Performed by: ADVANCED PRACTICE MIDWIFE

## 2025-03-31 PROCEDURE — 81002 URINALYSIS NONAUTO W/O SCOPE: CPT | Performed by: ADVANCED PRACTICE MIDWIFE

## 2025-03-31 PROCEDURE — 3074F SYST BP LT 130 MM HG: CPT | Performed by: ADVANCED PRACTICE MIDWIFE

## 2025-03-31 PROCEDURE — 99213 OFFICE O/P EST LOW 20 MIN: CPT | Performed by: ADVANCED PRACTICE MIDWIFE

## 2025-03-31 RX ORDER — METRONIDAZOLE 500 MG/1
500 TABLET ORAL 2 TIMES DAILY
Qty: 14 TABLET | Refills: 0 | Status: SHIPPED | OUTPATIENT
Start: 2025-03-31 | End: 2025-04-07

## 2025-04-01 LAB
BV BACTERIA DNA VAG QL NAA+PROBE: POSITIVE
C GLABRATA DNA VAG QL NAA+PROBE: NEGATIVE
C KRUSEI DNA VAG QL NAA+PROBE: NEGATIVE
C TRACH DNA SPEC QL NAA+PROBE: NEGATIVE
CANDIDA DNA VAG QL NAA+PROBE: NEGATIVE
N GONORRHOEA DNA SPEC QL NAA+PROBE: NEGATIVE
T VAGINALIS DNA VAG QL NAA+PROBE: NEGATIVE

## 2025-04-14 ENCOUNTER — TELEPHONE (OUTPATIENT)
Dept: OBGYN CLINIC | Facility: CLINIC | Age: 50
End: 2025-04-14

## 2025-04-14 NOTE — TELEPHONE ENCOUNTER
Patient requesting callback.  Was patient to be having an in-office visit for treatment of some test results (BV) replied to by Swetha on 4/1.    Zeyad -crystal  Pls advise

## 2025-04-14 NOTE — TELEPHONE ENCOUNTER
Pt seen in the office on 03/31/25 by ALETHA. Vaginal cultures done which came back pos for BV. Pt was given Flagyl and Betamethasone cream at her 3/31/25 and has completed the treatment. Advised pt no other treatment needed.

## (undated) DIAGNOSIS — Z12.31 VISIT FOR SCREENING MAMMOGRAM: Primary | ICD-10-CM

## (undated) DEVICE — 35 ML SYRINGE REGULAR TIP: Brand: MONOJECT

## (undated) DEVICE — LINE MNTR ADLT SET O2 INTMD

## (undated) DEVICE — CONMED SCOPE SAVER BITE BLOCK, 20X27 MM: Brand: SCOPE SAVER

## (undated) DEVICE — MEDI-VAC NON-CONDUCTIVE SUCTION TUBING 6MM X 1.8M (6FT.) L: Brand: CARDINAL HEALTH

## (undated) DEVICE — KIT CLEAN ENDOKIT 1.1OZ GOWNX2

## (undated) DEVICE — KIT ENDO ORCAPOD 160/180/190

## (undated) NOTE — LETTER
6/26/2020          To Whom It May Concern:    Rene Ortiz has recently been under my medical care and may safely return to work at this time. She may return to work on 6/29/20. Activity is restricted as follows: none.     If you require additional

## (undated) NOTE — MR AVS SNAPSHOT
[facility] SPECIALTY [facility] [place] [address] [place]  [id number] [phone]               Thank you for choosing us for your health care visit with Andrea Sosa MD.  We are glad to serve you and happy to provide you with this summary of y visit,  view other health information, and more. To sign up or find more information, go to [email or web address] and click on the Sign Up Now link in the [employer] box.      Enter your [employer] Activation Code exactly as it appears below along with yo EAT THESE FOODS MORE OFTEN: EAT THESE FOODS LESS OFTEN:   Make half your plate fruits and vegetables Highly refined, white starches including white bread, rice and pasta   Eat plenty of protein, keep the fat content low Sugars:  sodas and sports drinks, ca

## (undated) NOTE — ED AVS SNAPSHOT
[facility] Emergency Department    [address]     [place] [address]    Phone:  [phone]    Fax:  [phone]           Dione Rosasdaniella   MRN: [id number]    Department:  [facility] Emergency Department   Date of Visit:  1/27 It is our goal to assure that you are completely satisfied with every aspect of your visit today.   In an effort to constantly improve our service to you, we would appreciate any positive or negative feedback related to the care you received in our emergenc [employer] account. You may have had testing done that requires us to contact you. Please make sure we have your correct phone number on file.       I certified that I have received a copy of the aftercare instructions; that these instructions have been expl information, go to [email or web address] and click on the Sign Up Now link in the [employer] box. Enter your [employer] Activation Code exactly as it appears below along with your Zip Code and Date of Birth to complete the sign-up process.  If you do

## (undated) NOTE — ED AVS SNAPSHOT
[facility] Emergency Department    [address]     [place] [address]    Phone:  [phone]    Fax:  [phone]           Ronald Velasco   MRN: [id number]    Department:  [facility] Emergency Department   Date of Visit:  1/27 and Class Registration line at [phone] or find a doctor online by visiting [email or web address].    IF THERE IS ANY CHANGE OR WORSENING OF YOUR CONDITION, CALL YOUR PRIMARY CARE PHYSICIAN AT ONCE OR RETURN IMMEDIATELY TO [address] [place] [address].     If

## (undated) NOTE — LETTER
5/19/2020          To Whom It May Concern:    Brad Driscoll is currently under my medical care.   Please be advised that due to the patient's recent COVID 19 positive test, it would be advisable for the patient to self-isolate and quarantine at home for

## (undated) NOTE — LETTER
3/10/2025          To Whom It May Concern:    José May is currently under my medical care and may not return to work at this time.    Please excuse José for 3 days.  She may return to work on 03/13/2025.  Activity is restricted as follows: none.    If you require additional information please contact our office.        Sincerely,    ERMELINDA Collado           Document generated by:  ERMELINDA Collado

## (undated) NOTE — LETTER
[address] [place] [address] [address] [place] [address], [place]  Authorization for Surgical Operation and Procedure                                                                                           1. I hereby Debayan Marsh MD, my physician and his/her assistants (if applicable), which may include medical students, residents, and/or fellows, to perform the following surgical operation/ procedure and administer such anesthesia as may be determined necessary by my physician: Operation/Procedure name (s) COLONOSCOPY/ESOPHAGOGASTRODUODENOSCOPY (EGD) on [facility]   2. I recognize that during the surgical operation/procedure, unforeseen conditions may necessitate additional or different procedures than those listed above. I, therefore, further authorize and request that the above-named surgeon, assistants, or designees perform such procedures as are, in their judgment, necessary and desirable. 3.   My surgeon/physician has discussed prior to my surgery the potential benefits, risks and side effects of this procedure; the likelihood of achieving goals; and potential problems that might occur during recuperation. They also discussed reasonable alternatives to the procedure, including risks, benefits, and side effects related to the alternatives and risks related to not receiving this procedure. I have had all my questions answered and I acknowledge that no guarantee has been made as to the result that may be obtained. 4.   Should the need arise during my operation/procedure, which includes change of level of care prior to discharge, I also consent to the administration of blood and/or blood products. Further, I understand that despite careful testing and screening of blood or blood products by collecting agencies, I may still be subject to ill effects as a result of receiving a blood transfusion and/or blood products.   The following are some, but not all, of the potential risks that can occur: fever and allergic reactions, hemolytic reactions, transmission of diseases such as Hepatitis, AIDS and Cytomegalovirus (CMV) and fluid overload. In the event that I wish to have an autologous transfusion of my own blood, or a directed donor transfusion, I will discuss this with my physician. Check only if Refusing Blood or Blood Products  I understand refusal of blood or blood products as deemed necessary by my physician may have serious consequences to my condition to include possible death. I hereby assume responsibility for my refusal and release the hospital, its personnel, and my physicians from any responsibility for the consequences of my refusal.           ____ Refuse      5. I authorize the use of any specimen, organs, tissues, body parts or foreign objects that may be removed from my body during the operation/procedure for diagnosis, research or teaching purposes and their subsequent disposal by hospital authorities. I also authorize the release of specimen test results and/or written reports to my treating physician on the hospital medical staff or other referring or consulting physicians involved in my care, at the discretion of the Pathologist or my treating physician. 6.   I consent to the photographing or videotaping of the operations or procedures to be performed, including appropriate portions of my body for medical, scientific, or educational purposes, provided my identity is not revealed by the pictures or by descriptive texts accompanying them. If the procedure has been photographed/videotaped, the surgeon will obtain the original picture, image, videotape or CD. The hospital will not be responsible for storage, release or maintenance of the picture, image, tape or CD.    7.   I consent to the presence of a  or observers in the operating room as deemed necessary by my physician or their designees.     8.   I recognize that in the event my procedure results in extended X-Ray/fluoroscopy time, I may develop a skin reaction. 9. If I have a Do Not Attempt Resuscitation (DNAR) order in place, that status will be suspended while in the operating room, procedural suite, and during the recovery period unless otherwise explicitly stated by me (or a person authorized to consent on my behalf). The surgeon or my attending physician will determine when the applicable recovery period ends for purposes of reinstating the DNAR order. 10. Patients having a sterilization procedure: I understand that if the procedure is successful the results will be permanent and it will therefore be impossible for me to inseminate, conceive, or bear children. I also understand that the procedure is intended to result in sterility, although the result has not been guaranteed. 11. I acknowledge that my physician has explained sedation/analgesia administration to me including the risk and benefits I consent to the administration of sedation/analgesia as may be necessary or desirable in the judgment of my physician. I CERTIFY THAT I HAVE READ AND FULLY UNDERSTAND THE ABOVE CONSENT TO OPERATION and/or OTHER PROCEDURE.     _________________________________________ _________________________________     ___________________________________  Signature of Patient     Signature of Responsible Person                   Printed Name of Responsible Person                              _________________________________________ ______________________________        ___________________________________  Signature of Witness         Date  Time         Relationship to Patient    STATEMENT OF PHYSICIAN My signature below affirms that prior to the time of the procedure; I have explained to the patient and/or his/her legal representative, the risks and benefits involved in the proposed treatment and any reasonable alternative to the proposed treatment.  I have also explained the risks and benefits involved in refusal of the proposed treatment and alternatives to the proposed treatment and have answered the patient's questions.  If I have a significant financial interest in a co-management agreement or a significant financial interest in any product or implant, or other significant relationship used in this procedure/surgery, I have disclosed this and had a discussion with my patient.     _______________________________________________________________ _____________________________  (Signature of Physician)                                                                                         (Date)                                   (Time)  Patient Name: Hemanth Kinney    : 1975   Printed: 11/15/2022      Medical Record #: [id number]                                              Page 1 of 1

## (undated) NOTE — ED AVS SNAPSHOT
Eleni Brwon   MRN: [id number]    Department:  [facility] Emergency Department   Date of Visit:  3/6/2019           Disclosure     Insurance plans vary and the physician(s) referred by the ER may not be covered by your plan.  Please contact CARE PHYSICIAN AT ONCE OR RETURN IMMEDIATELY TO THE EMERGENCY DEPARTMENT. If you have been prescribed any medication(s), please fill your prescription right away and begin taking the medication(s) as directed.   If you believe that any of the medications

## (undated) NOTE — LETTER
3/6/2023              [facility]         Dear Alaina Bond,    [facility] records indicate that the tests ordered for you by Caitie Stafford MD  have not been done. If you have, in fact, already completed the tests or you do not wish to have the tests done, please contact our office at [address] [place] [address]. Otherwise, please proceed with the testing. Enclosed is a duplicate order for your convenience.     Lab order:   ALK PHOS ISOENZYME (Order #[id number]) on 9/28/22  Please call Central Scheduling at [phone] to schedule this test order  Por favor llame a Central scheduling al [phone] para programar esta orden de prueba    Sincerely,    Caitie Stafford MD  [facility], [facility]  [address] [place] [address]  [phone]